# Patient Record
Sex: FEMALE | Race: WHITE | NOT HISPANIC OR LATINO | Employment: FULL TIME | URBAN - METROPOLITAN AREA
[De-identification: names, ages, dates, MRNs, and addresses within clinical notes are randomized per-mention and may not be internally consistent; named-entity substitution may affect disease eponyms.]

---

## 2018-08-02 ENCOUNTER — OFFICE VISIT (OUTPATIENT)
Dept: INTERNAL MEDICINE | Facility: CLINIC | Age: 33
End: 2018-08-02
Payer: COMMERCIAL

## 2018-08-02 VITALS
DIASTOLIC BLOOD PRESSURE: 78 MMHG | WEIGHT: 218.06 LBS | TEMPERATURE: 98 F | SYSTOLIC BLOOD PRESSURE: 130 MMHG | RESPIRATION RATE: 18 BRPM | HEART RATE: 90 BPM | HEIGHT: 65 IN | BODY MASS INDEX: 36.33 KG/M2 | OXYGEN SATURATION: 100 %

## 2018-08-02 DIAGNOSIS — G93.2 PSEUDOTUMOR CEREBRI: Primary | ICD-10-CM

## 2018-08-02 PROCEDURE — 3008F BODY MASS INDEX DOCD: CPT | Mod: CPTII,S$GLB,, | Performed by: FAMILY MEDICINE

## 2018-08-02 PROCEDURE — 99204 OFFICE O/P NEW MOD 45 MIN: CPT | Mod: S$GLB,,, | Performed by: FAMILY MEDICINE

## 2018-08-02 PROCEDURE — 99999 PR PBB SHADOW E&M-EST. PATIENT-LVL IV: CPT | Mod: PBBFAC,,, | Performed by: FAMILY MEDICINE

## 2018-08-02 RX ORDER — ACETAZOLAMIDE 500 MG/1
CAPSULE, EXTENDED RELEASE ORAL
COMMUNITY
Start: 2018-07-16 | End: 2018-09-19 | Stop reason: SINTOL

## 2018-08-02 NOTE — ASSESSMENT & PLAN NOTE
Discussed the importance of a low-sodium diet trying for weight loss.  She already has had a gastric banding procedure.  Referring to Neurology and Ophthalmology for additional recommendations.  In the meantime, I recommended that she continue to take the acetazolamide.

## 2018-08-02 NOTE — PROGRESS NOTES
Subjective:       Patient ID: Shruti Mercado is a 33 y.o. female.    Chief Complaint: Establish Care    HPI  Shruti is here today to establish care and to discuss a chronic problem that she has been having for the last 5 years.  She started having migraines around 2013.  At that time had a normal MRI.  Was seen by Neurology and has had MRI and MRA at [facility] along with lumbar puncture around this time last year.  The working diagnosis was pseudotumor cerebral high and she was put on Diamox which she has been on since last June.  Taking diamox 500mg since last year. Makes hands and feet to tingle but it does help to keep the pressure down.  When she talks taking it she will have an increased sensation of pressure.  She was found to have some papilledema of the right eye last year which is when she was referred to the neurologist.  She has not seen a neurologist in about a year and currently is looking to get in to another neurology consultation because she was not satisfied with her previous experience.  She is just looking for what else she can do to help prevent her headaches and not have to take the medication because it is causing side effects.  The headaches are frequent and limit her activities.  She has not had any new change in her vision.  Whenever she has severe headaches will will make it better is going to a cool room , laying down and putting a cold towel over her eyes.    Activity makes her head pound. Also sun sensitive.      Family History   Problem Relation Age of Onset    Hypertension Mother     Hypertension Father     Diabetes Father     Cancer Sister     No Known Problems Daughter     No Known Problems Son     No Known Problems Sister     No Known Problems Sister     No Known Problems Sister     Cancer Paternal Grandfather     Stroke Paternal Grandfather        Current Outpatient Prescriptions:     acetaZOLAMIDE (DIAMOX) 500 mg CpSR, , Disp: , Rfl:     Review of Systems  "  Constitutional: Negative for chills and fever.   HENT: Negative for congestion and sore throat.    Eyes: Negative for visual disturbance.   Respiratory: Negative for cough and shortness of breath.    Cardiovascular: Negative for chest pain.   Gastrointestinal: Negative for abdominal pain, constipation and diarrhea.   Endocrine: Negative for polydipsia and polyuria.   Genitourinary: Negative for difficulty urinating and menstrual problem.   Skin: Negative for rash.   Neurological: Positive for headaches. Negative for dizziness, speech difficulty, weakness and light-headedness.   Hematological: Does not bruise/bleed easily.       Objective:   /78 (BP Location: Left arm, Patient Position: Sitting, BP Method: Medium (Automatic))   Pulse 90   Temp 97.8 °F (36.6 °C) (Tympanic)   Resp 18   Ht 5' 4.5" (1.638 m)   Wt 98.9 kg (218 lb 0.6 oz)   LMP 07/15/2018 (Within Weeks) Comment: Mirena  SpO2 100%   BMI 36.85 kg/m²      Physical Exam   Constitutional: She is oriented to person, place, and time. She appears well-developed and well-nourished. No distress.   HENT:   Head: Normocephalic and atraumatic.   Nose: Nose normal.   Eyes: Conjunctivae and EOM are normal. Pupils are equal, round, and reactive to light. Right eye exhibits no discharge. Left eye exhibits no discharge.   Neck: No thyromegaly present.   Cardiovascular: Normal rate and regular rhythm.    No murmur heard.  Pulmonary/Chest: Effort normal and breath sounds normal. No respiratory distress.   Abdominal: Soft. She exhibits no distension.   Musculoskeletal: She exhibits no edema.   Neurological: She is alert and oriented to person, place, and time. She displays normal reflexes. No cranial nerve deficit. Coordination normal.   Skin: No rash noted. She is not diaphoretic.   Psychiatric: She has a normal mood and affect. Her behavior is normal.       Assessment & Plan     Problem List Items Addressed This Visit        Neuro    Pseudotumor cerebri - " Primary    Current Assessment & Plan     Discussed the importance of a low-sodium diet trying for weight loss.  She already has had a gastric banding procedure.  Referring to Neurology and Ophthalmology for additional recommendations.  In the meantime, I recommended that she continue to take the acetazolamide.         Relevant Orders    Ambulatory Referral to Neurology    Ambulatory Referral to Ophthalmology            No Follow-up on file.

## 2018-08-09 ENCOUNTER — OFFICE VISIT (OUTPATIENT)
Dept: OPHTHALMOLOGY | Facility: CLINIC | Age: 33
End: 2018-08-09
Payer: COMMERCIAL

## 2018-08-09 DIAGNOSIS — G93.2 PSEUDOTUMOR CEREBRI: Primary | ICD-10-CM

## 2018-08-09 DIAGNOSIS — H52.13 MYOPIA, BILATERAL: ICD-10-CM

## 2018-08-09 DIAGNOSIS — H52.223 REGULAR ASTIGMATISM OF BOTH EYES: ICD-10-CM

## 2018-08-09 PROCEDURE — 92004 COMPRE OPH EXAM NEW PT 1/>: CPT | Mod: S$GLB,,, | Performed by: OPTOMETRIST

## 2018-08-09 PROCEDURE — 92310 CONTACT LENS FITTING OU: CPT | Mod: S$GLB,,, | Performed by: OPTOMETRIST

## 2018-08-09 PROCEDURE — 92133 CPTRZD OPH DX IMG PST SGM ON: CPT | Mod: S$GLB,ICN,, | Performed by: OPTOMETRIST

## 2018-08-09 PROCEDURE — 99999 PR PBB SHADOW E&M-EST. PATIENT-LVL I: CPT | Mod: PBBFAC,,, | Performed by: OPTOMETRIST

## 2018-08-09 PROCEDURE — 92015 DETERMINE REFRACTIVE STATE: CPT | Mod: S$GLB,,, | Performed by: OPTOMETRIST

## 2018-08-09 NOTE — LETTER
August 10, 2018      Nilesh Espinosa DO  [address] [address] [address]           [facility]  [address] [address]  Phone: [phone]  Fax: [phone]          Patient: Shruti Mercado   MR Number: [id number]   YOB: 1985   Date of Visit: 8/9/2018       Dear Dr. Nilesh Espinosa:    Thank you for referring Shruti Mercado to me for evaluation. Attached you will find relevant portions of my assessment and plan of care.    If you have questions, please do not hesitate to call me. I look forward to following Shruti Mercado along with you.    Sincerely,    Andres Holt, OD    Enclosure  CC:  No Recipients    If you would like to receive this communication electronically, please contact [email or web address] or [phone] to request more information on [facility] [employer] [facility] Link access.    For providers and/or their staff who would like to refer a patient to [facility], please contact us through our one-stop-shop provider referral line, [facility], at [phone].    If you feel you have received this communication in error or would no longer like to receive these types of communications, please e-mail [email or web address]

## 2018-08-10 NOTE — PROGRESS NOTES
HPI     PTs last exam was 6 months ago. PT c/o blurred vision and wears cls most   of the time.   H/o Pseudotumor cerebri x 1 year. Has had spinal tap 1 year ago  Diamox 500 mg daily, does not like to take. Feels it makes headaches worse  HPI    Any vision changes since last exam: decreased distance va  Eye pain: 0  Other ocular symptoms: migraines once a week    Do you wear currently wear glasses or contacts? both    Interested in contacts today? Yes, wears air optix    Do you plan on getting new glasses today? If needed      Last edited by Andres Holt, OD on 8/9/2018  3:55 PM. (History)            Assessment /Plan     For exam results, see Encounter Report.    Pseudotumor cerebri  -     Posterior Segment OCT Optic Nerve- Both eyes  -     Vincent Visual Field - OU - Extended - Both Eyes; Future  va stable OD, OS today  Baseline dOCT today  Trace-1+ ONH elevation nasal aspect of nerve only  No hemes, margin visible 360  Has appt with neuro next month to establish care at [facility]  rtc for 24-2VF EPrx    Myopia, bilateral  Regular astigmatism of both eyes  Eyeglass Final Rx     Eyeglass Final Rx       Sphere Cylinder Axis    Right -1.75 +0.50 020    Left -2.50 +0.50 155    Expiration Date:  8/10/2019              Contact Lens Prescription (8/9/2018)        Brand Sphere    Right Air Optix Aqua -1.50    Left Air Optix Aqua -2.25    Expiration Date:  8/10/2019    Replacement:  Monthly    Wearing Schedule:  Daily wear        Dispensed trial contact lenses today. Patient is to wear lenses for 1 week. Ok to order supply if no problems. RTC PRN if any problems arise.      RTC for 24-2VF and review, next available

## 2018-08-24 ENCOUNTER — OFFICE VISIT (OUTPATIENT)
Dept: OPHTHALMOLOGY | Facility: CLINIC | Age: 33
End: 2018-08-24
Payer: COMMERCIAL

## 2018-08-24 DIAGNOSIS — G93.2 PSEUDOTUMOR CEREBRI: Primary | ICD-10-CM

## 2018-08-24 PROCEDURE — 92083 EXTENDED VISUAL FIELD XM: CPT | Mod: S$GLB,,, | Performed by: OPTOMETRIST

## 2018-08-24 PROCEDURE — 99499 UNLISTED E&M SERVICE: CPT | Mod: S$GLB,,, | Performed by: OPTOMETRIST

## 2018-08-24 PROCEDURE — 99999 PR PBB SHADOW E&M-EST. PATIENT-LVL I: CPT | Mod: PBBFAC,,, | Performed by: OPTOMETRIST

## 2018-08-24 NOTE — PROGRESS NOTES
HPI     Follow-up      Additional comments: REV HVF              Comments     PT was last seen on 8/9/18 with DNL for full exam and pseudotumor   cerebri. PT was told to rtc for 24-2VF.   Has not seen neurology, goes next month.   Diamox every other day per pt          Last edited by Luz Sandhu MA on 8/24/2018  2:48 PM. (History)            Assessment /Plan     For exam results, see Encounter Report.    Pseudotumor cerebri  -     Vincent Visual Field - OU - Extended - Both Eyes      No visual field defects OD, OS  Keep scheduled appt with Neurology  F/u 3 months    RTC 3 months for repeat dOCT or PRN  Discussed above and all questions were answered.

## 2018-09-19 ENCOUNTER — OFFICE VISIT (OUTPATIENT)
Dept: NEUROLOGY | Facility: CLINIC | Age: 33
End: 2018-09-19
Payer: COMMERCIAL

## 2018-09-19 DIAGNOSIS — G93.2 PSEUDOTUMOR CEREBRI: Primary | ICD-10-CM

## 2018-09-19 DIAGNOSIS — Z98.51 S/P TUBAL LIGATION: ICD-10-CM

## 2018-09-19 DIAGNOSIS — G43.009 MIGRAINE WITHOUT AURA AND WITHOUT STATUS MIGRAINOSUS, NOT INTRACTABLE: ICD-10-CM

## 2018-09-19 PROCEDURE — 99243 OFF/OP CNSLTJ NEW/EST LOW 30: CPT | Mod: S$GLB,,, | Performed by: PSYCHIATRY & NEUROLOGY

## 2018-09-19 PROCEDURE — 99999 PR PBB SHADOW E&M-EST. PATIENT-LVL II: CPT | Mod: PBBFAC,,, | Performed by: PSYCHIATRY & NEUROLOGY

## 2018-09-19 RX ORDER — TOPIRAMATE 50 MG/1
50 CAPSULE, EXTENDED RELEASE ORAL DAILY
Qty: 30 CAPSULE | Refills: 11 | Status: SHIPPED | OUTPATIENT
Start: 2018-09-19 | End: 2019-07-02

## 2018-09-19 NOTE — LETTER
September 19, 2018    To Whom it may concern:          [facility]  Neurology  [address]  [place] [address]  Phone: [phone]   September 19, 2018     Patient: Shruti Mercado   YOB: 1985   Date of Visit: 9/19/2018       To Whom it May Concern:    Shruti Mercado was seen in my clinic on 9/19/2018. She If you have any questions or concerns, please don't hesitate to call.    Sincerely,         Sangita Ma LPN

## 2018-09-19 NOTE — PATIENT INSTRUCTIONS
Topiramate tablets  What is this medicine?  TOPIRAMATE (toe PYRE a mate) is used to treat seizures in adults or children with epilepsy. It is also used for the prevention of migraine headaches.  How should I use this medicine?  Take this medicine by mouth with a glass of water. Follow the directions on the prescription label. Do not crush or chew. You may take this medicine with meals. Take your medicine at regular intervals. Do not take it more often than directed.  Talk to your pediatrician regarding the use of this medicine in children. Special care may be needed. While this drug may be prescribed for children as young as 2 years of age for selected conditions, precautions do apply.  What side effects may I notice from receiving this medicine?  Side effects that you should report to your doctor or health care professional as soon as possible:  · allergic reactions like skin rash, itching or hives, swelling of the face, lips, or tongue  · decreased sweating and/or rise in body temperature  · depression  · difficulty breathing, fast or irregular breathing patterns  · difficulty speaking  · difficulty walking or controlling muscle movements  · hearing impairment  · redness, blistering, peeling or loosening of the skin, including inside the mouth  · tingling, pain or numbness in the hands or feet  · unusual bleeding or bruising  · unusually weak or tired  · worsening of mood, thoughts or actions of suicide or dying  Side effects that usually do not require medical attention (report to your doctor or health care professional if they continue or are bothersome):  · altered taste  · back pain, joint or muscle aches and pains  · diarrhea, or constipation  · headache  · loss of appetite  · nausea  · stomach upset, indigestion  · tremors  What may interact with this medicine?  Do not take this medicine with any of the following medications:  · probenecid  This medicine may also interact with the following  medications:  · acetazolamide  · alcohol  · amitriptyline  · aspirin and aspirin-like medicines  · birth control pills  · certain medicines for depression  · certain medicines for seizures  · certain medicines that treat or prevent blood clots like warfarin, enoxaparin, dalteparin, apixaban, dabigatran, and rivaroxaban  · digoxin  · hydrochlorothiazide  · lithium  · medicines for pain, sleep, or muscle relaxation  · metformin  · methazolamide  · NSAIDS, medicines for pain and inflammation, like ibuprofen or naproxen  · pioglitazone  · risperidone  What if I miss a dose?  If you miss a dose, take it as soon as you can. If your next dose is to be taken in less than 6 hours, then do not take the missed dose. Take the next dose at your regular time. Do not take double or extra doses.  Where should I keep my medicine?  Keep out of the reach of children.  Store at room temperature between 15 and 30 degrees C (59 and 86 degrees F) in a tightly closed container. Protect from moisture. Throw away any unused medicine after the expiration date.  What should I tell my health care provider before I take this medicine?  They need to know if you have any of these conditions:  · bleeding disorders  · cirrhosis of the liver or liver disease  · diarrhea  · glaucoma  · kidney stones or kidney disease  · low blood counts, like low white cell, platelet, or red cell counts  · lung disease like asthma, obstructive pulmonary disease, emphysema  · metabolic acidosis  · on a ketogenic diet  · schedule for surgery or a procedure  · suicidal thoughts, plans, or attempt; a previous suicide attempt by you or a family member  · an unusual or allergic reaction to topiramate, other medicines, foods, dyes, or preservatives  · pregnant or trying to get pregnant  · breast-feeding  What should I watch for while using this medicine?  Visit your doctor or health care professional for regular checks on your progress. Do not stop taking this medicine  suddenly. This increases the risk of seizures if you are using this medicine to control epilepsy. Wear a medical identification bracelet or chain to say you have epilepsy or seizures, and carry a card that lists all your medicines.  This medicine can decrease sweating and increase your body temperature. Watch for signs of  sweating or fever, especially in children. Avoid extreme heat, hot baths, and saunas. Be careful about exercising, especially in hot weather. Contact your health care provider right away if you notice a fever or decrease in sweating.  You should drink plenty of fluids while taking this medicine. If you have had kidney stones in the past, this will help to reduce your chances of forming kidney stones.  If you have stomach pain, with nausea or vomiting and yellowing of your eyes or skin, call your doctor immediately.  You may get drowsy, dizzy, or have blurred vision. Do not drive, use machinery, or do anything that needs mental alertness until you know how this medicine affects you. To reduce dizziness, do not sit or stand up quickly, especially if you are an older patient. Alcohol can increase drowsiness and dizziness. Avoid alcoholic drinks.  If you notice blurred vision, eye pain, or other eye problems, seek medical attention at once for an eye exam.  The use of this medicine may increase the chance of suicidal thoughts or actions. Pay special attention to how you are responding while on this medicine. Any worsening of mood, or thoughts of suicide or dying should be reported to your health care professional right away.  This medicine may increase the chance of developing metabolic acidosis. If left untreated, this can cause kidney stones, bone disease, or slowed growth in children. Symptoms include breathing fast, fatigue, loss of appetite, irregular heartbeat, or loss of consciousness. Call your doctor immediately if you experience any of these side effects. Also, tell your doctor about  any surgery you plan on having while taking this medicine since this may increase your risk for metabolic acidosis.  Birth control pills may not work properly while you are taking this medicine. Talk to your doctor about using an extra method of birth control.  Women who become pregnant while using this medicine may enroll in the [place] Antiepileptic Drug Pregnancy Registry by calling 1-366.277.3463. This registry collects information about the safety of antiepileptic drug use during pregnancy.  NOTE:This sheet is a summary. It may not cover all possible information. If you have questions about this medicine, talk to your doctor, pharmacist, or health care provider. Copyright© 2017 [employer]

## 2018-09-19 NOTE — LETTER
September 19, 2018      Nilesh Espinosa DO  [address] [address] [address]           [facility]  [address] [facility] [address]  Phone: [phone]          Patient: Shruti Mercado   MR Number: [id number]   YOB: 1985   Date of Visit: 9/19/2018       Dear Dr. Nilesh Espinosa:    Thank you for referring Shruti Mercado to me for evaluation. Attached you will find relevant portions of my assessment and plan of care.    If you have questions, please do not hesitate to call me. I look forward to following Shruti Mercado along with you.    Sincerely,    Shahla Harry MD    Enclosure  CC:  No Recipients    If you would like to receive this communication electronically, please contact [email or web address] or [phone] to request more information on [facility] Link access.    For providers and/or their staff who would like to refer a patient to [facility], please contact us through our one-stop-shop provider referral line, [facility], at [phone].    If you feel you have received this communication in error or would no longer like to receive these types of communications, please e-mail [email or web address]

## 2018-09-19 NOTE — PROGRESS NOTES
Subjective:       Patient ID: Shruti Mercado is a 33 y.o. female.    Chief Complaint: pseudotum Cerebri; Headache; and Blurred Vision    HPI     The patient was referred by Dr. Espinosa for evaluation.    The patient has been having holocephalic throbbing 8-10/10 headaches lasting for 24 hours and associated with nausea and light sensitivity, visual obscurations and tinnitus for 2-3 years. In 2017 she was found to have B/L papilledema with unremarkable Brain MRI/MRA and OP of 20 cm H2O. She is on Diamox 500 mg BID but she takes it QOD because she does not like the SEs of numbness though it helps with her symptoms.        Review of Systems   Constitutional: Negative for appetite change and fatigue.   HENT: Positive for tinnitus. Negative for hearing loss.    Eyes: Positive for visual disturbance. Negative for photophobia.   Respiratory: Negative for apnea and shortness of breath.    Cardiovascular: Negative for chest pain and palpitations.   Gastrointestinal: Negative for nausea and vomiting.   Endocrine: Negative for cold intolerance and heat intolerance.   Genitourinary: Negative for difficulty urinating and urgency.   Musculoskeletal: Negative for arthralgias, back pain, gait problem, joint swelling, myalgias, neck pain and neck stiffness.   Skin: Negative for color change and rash.   Allergic/Immunologic: Negative for environmental allergies and immunocompromised state.   Neurological: Positive for numbness and headaches. Negative for dizziness, tremors, seizures, syncope, facial asymmetry, speech difficulty, weakness and light-headedness.   Hematological: Negative for adenopathy. Does not bruise/bleed easily.   Psychiatric/Behavioral: Negative for agitation, behavioral problems, confusion, decreased concentration, dysphoric mood, hallucinations, self-injury, sleep disturbance and suicidal ideas. The patient is not hyperactive.          Current Outpatient Medications:     topiramate (TROKENDI XR) 50 mg Cp24, Take  50 mg by mouth once daily., Disp: 30 capsule, Rfl: 11  Past Medical History:   Diagnosis Date    Morbid obesity      Past Surgical History:   Procedure Laterality Date    ABSCESS DRAINAGE      ESOPHAGOGASTRODUODENOSCOPY (EGD) N/A 4/17/2015    Performed by William Fu III, [facility] at [facility] ENDO    GASTRECTOMY-SLEEVE-LAPAROSCOPIC N/A 5/27/2015    Performed by Louis O. Jeansonne IV, MD at [facility] OR    LAPAROSCOPIC TUBAL LIGATION W/ ANTOINETTE ROSS  2008    OVARIAN CYST SURGERY      sleeve      TUBAL LIGATION       Social History     Socioeconomic History    Marital status:      Spouse name: Not on file    Number of children: Not on file    Years of education: Not on file    Highest education level: Not on file   Social Needs    Financial resource strain: Not on file    Food insecurity - worry: Not on file    Food insecurity - inability: Not on file    Transportation needs - medical: Not on file    Transportation needs - non-medical: Not on file   Occupational History    Not on file   Tobacco Use    Smoking status: Never Smoker    Smokeless tobacco: Never Used   Substance and Sexual Activity    Alcohol use: No    Drug use: No    Sexual activity: Yes   Other Topics Concern    Not on file   Social History Narrative    , 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        Objective:     GENERAL APPEARANCE:     The patient looks comfortable.    No signs of medical or psychiatric distress.    Normal breathing pattern.    No dysmorphic features    Normal eye contact.     GENERAL MEDICAL EXAM:    HEENT:  Head is atraumatic normocephalic. No tender temporal arteries.     Neck and Axillae: No JVD. No carotid bruits. No thyromegaly. No lymphadenopathy.    Cardiopulmonary: No cyanosis. No tachypnea. Normal respiratory effort.  Clear breath sounds. Normal heart sounds with regular rhythm and no murmurs.    Gastrointestinal:  No stomas or lesions. No hernias.  Abdomen is soft non-tender. No masses or  organomegaly.    Skin, Hair and Nails: No pathognonomic skin rash. No neurofibromatosis.   No stigmata of autoimmune disease.     Limbs: No varicose veins. No edema. Symmetric pulses.     Muskoskeletal: No deformities.No spine tenderness.   No signs of longstanding neuropathy. No dislocations or fractures.        Neurologic Exam     Mental Status   Oriented to person, place, and time.   Registration: recalls 3 of 3 objects. Recall at 5 minutes: recalls 3 of 3 objects. Follows 3 step commands.   Attention: normal. Concentration: normal.   Speech: speech is normal   Level of consciousness: alert  Knowledge: good and consistent with education. Able to perform simple calculations.   Able to name object. Able to read. Able to repeat. Able to write. Normal comprehension.     Cranial Nerves     CN II   Visual fields full to confrontation.   Visual acuity: normal  Right visual field deficit: none  Left visual field deficit: none     CN III, IV, VI   Pupils are equal, round, and reactive to light.  Extraocular motions are normal.   Right pupil: Size: 2 mm. Shape: regular. Reactivity: brisk. Consensual response: intact. Accommodation: intact.   Left pupil: Size: 2 mm. Shape: regular. Reactivity: brisk. Consensual response: intact. Accommodation: intact.   CN III: no CN III palsy  CN VI: no CN VI palsy  Nystagmus: none   Diplopia: none  Ophthalmoparesis: none  Upgaze: normal  Downgaze: normal  Conjugate gaze: present  Vestibulo-ocular reflex: present    CN V   Facial sensation intact.   Right facial sensation deficit: none  Left facial sensation deficit: none  Right corneal reflex: normal  Left corneal reflex: normal    CN VII   Right facial weakness: none  Left facial weakness: none  Right taste: normal  Left taste: normal    CN VIII   CN VIII normal.   Hearing: intact  Right Rinne: AC > BC  Left Rinne: AC > BC  Thompson: does not lateralize     CN IX, X   CN IX normal.   CN X normal.   Palate: symmetric  Right gag reflex:  normal  Left gag reflex: normal    CN XI   CN XI normal.   Right sternocleidomastoid strength: normal  Left sternocleidomastoid strength: normal  Right trapezius strength: normal  Left trapezius strength: normal    CN XII   CN XII normal.   Tongue: not atrophic  Fasciculations: absent  Tongue deviation: none  Mild B/L Papilledema      Motor Exam   Muscle bulk: normal  Overall muscle tone: normal  Right arm tone: normal  Left arm tone: normal  Right arm pronator drift: absent  Left arm pronator drift: absent  Right leg tone: normal  Left leg tone: normal    Strength   Strength 5/5 throughout.   Right neck flexion: 5/5  Left neck flexion: 5/5  Right neck extension: 5/5  Left neck extension: 5/5  Right deltoid: 5/5  Left deltoid: 5/5  Right biceps: 5/5  Left biceps: 5/5  Right triceps: 5/5  Left triceps: 5/5  Right wrist flexion: 5/5  Left wrist flexion: 5/5  Right wrist extension: 5/5  Left wrist extension: 5/5  Right interossei: 5/5  Left interossei: 5/5  Right abdominals: 5/5  Left abdominals: 5/5  Right iliopsoas: 5/5  Left iliopsoas: 5/5  Right quadriceps: 5/5  Left quadriceps: 5/5  Right hamstrin/5  Left hamstrin/5  Right glutei: 5/5  Left glutei: 5/5  Right anterior tibial: 5/5  Left anterior tibial: 5/5  Right posterior tibial: 5/5  Left posterior tibial: 5/5  Right peroneal: 5/5  Left peroneal: 5/5  Right gastroc: 5/5  Left gastroc: 5/5    Sensory Exam   Light touch normal.   Right arm light touch: normal  Left arm light touch: normal  Right leg light touch: normal  Left leg light touch: normal  Vibration normal.   Right arm vibration: normal  Left arm vibration: normal  Right leg vibration: normal  Left leg vibration: normal  Proprioception normal.   Right arm proprioception: normal  Left arm proprioception: normal  Right leg proprioception: normal  Left leg proprioception: normal  Pinprick normal.   Right arm pinprick: normal  Left arm pinprick: normal  Right leg pinprick: normal  Left leg pinprick:  normal  Graphesthesia: normal  Stereognosis: normal    Gait, Coordination, and Reflexes     Gait  Gait: normal    Coordination   Romberg: negative  Finger to nose coordination: normal  Heel to shin coordination: normal  Tandem walking coordination: normal    Tremor   Resting tremor: absent  Intention tremor: absent  Action tremor: absent    Reflexes   Right brachioradialis: 2+  Left brachioradialis: 2+  Right biceps: 2+  Left biceps: 2+  Right triceps: 2+  Left triceps: 2+  Right patellar: 2+  Left patellar: 2+  Right achilles: 2+  Left achilles: 2+  Right : 2+  Left : 2+  Right plantar: normal  Left plantar: normal  Right Kumari: absent  Left Kumari: absent  Right ankle clonus: absent  Left ankle clonus: absent  Right pendular knee jerk: absent  Left pendular knee jerk: absent      Lab Results   Component Value Date    WBC 7.46 10/22/2015    HGB 12.2 10/22/2015    HCT 37.1 10/22/2015    MCV 90 10/22/2015     10/22/2015     Sodium   Date Value Ref Range Status   10/22/2015 143 136 - 145 mmol/L Final     Potassium   Date Value Ref Range Status   10/22/2015 3.9 3.5 - 5.1 mmol/L Final     Chloride   Date Value Ref Range Status   10/22/2015 104 95 - 110 mmol/L Final     CO2   Date Value Ref Range Status   10/22/2015 27 23 - 29 mmol/L Final     Glucose   Date Value Ref Range Status   10/22/2015 77 70 - 110 mg/dL Final     BUN, Bld   Date Value Ref Range Status   10/22/2015 15 6 - 20 mg/dL Final     Creatinine   Date Value Ref Range Status   10/22/2015 0.9 0.5 - 1.4 mg/dL Final     Calcium   Date Value Ref Range Status   10/22/2015 9.9 8.7 - 10.5 mg/dL Final     Total Protein   Date Value Ref Range Status   10/22/2015 7.4 6.0 - 8.4 g/dL Final     Albumin   Date Value Ref Range Status   10/22/2015 4.2 3.5 - 5.2 g/dL Final     Total Bilirubin   Date Value Ref Range Status   10/22/2015 0.3 0.1 - 1.0 mg/dL Final     Comment:     For infants and newborns, interpretation of results should be based  on  gestational age, weight and in agreement with clinical  observations.  Premature Infant recommended reference ranges:  Up to 24 hours.............<8.0 mg/dL  Up to 48 hours............<12.0 mg/dL  3-5 days..................<15.0 mg/dL  6-29 days.................<15.0 mg/dL       Alkaline Phosphatase   Date Value Ref Range Status   10/22/2015 61 55 - 135 U/L Final     AST   Date Value Ref Range Status   10/22/2015 17 10 - 40 U/L Final     ALT   Date Value Ref Range Status   10/22/2015 16 10 - 44 U/L Final     Anion Gap   Date Value Ref Range Status   10/22/2015 12 8 - 16 mmol/L Final     eGFR if    Date Value Ref Range Status   10/22/2015 >60.0 >60 mL/min/1.73 m^2 Final     eGFR if non    Date Value Ref Range Status   10/22/2015 >60.0 >60 mL/min/1.73 m^2 Final     Comment:     Calculation used to obtain the estimated glomerular filtration  rate (eGFR) is the CKD-EPI equation. Since race is unknown   in our information system, the eGFR values for   -American and Non--American patients are given   for each creatinine result.       Lab Results   Component Value Date    CBQNLONL35 696 10/22/2015     Lab Results   Component Value Date    TSH 2.441 08/07/2015    FREET4 0.78 08/07/2015 2017    Brain MRI MRA NL    CSF OP 20 cmH2O    Assessment:       1. Pseudotumor cerebri    2. Migraine without aura and without status migrainosus, not intractable        Plan:       Try changing Diamox to TPM XR (Trokenddi XR) with slow titration to  mg.    She is S/P BTL.       RTC in 4 weeks

## 2018-09-21 ENCOUNTER — TELEPHONE (OUTPATIENT)
Dept: NEUROLOGY | Facility: CLINIC | Age: 33
End: 2018-09-21

## 2018-09-21 DIAGNOSIS — G93.2 PSEUDOTUMOR CEREBRI: ICD-10-CM

## 2018-09-21 DIAGNOSIS — G43.009 MIGRAINE WITHOUT AURA AND WITHOUT STATUS MIGRAINOSUS, NOT INTRACTABLE: ICD-10-CM

## 2018-09-21 NOTE — TELEPHONE ENCOUNTER
Called patient with information that [employer] is to lets us know about prior approval for topamax in 24-72 hours patient aware and gave the web site to the patient for o co- pay thru the drug company patient voiced understanding and was appreciative.

## 2018-09-21 NOTE — TELEPHONE ENCOUNTER
----- Message from Kathy Wagner sent at 9/21/2018 10:59 AM CDT -----  Contact: pt  States she would like to speak to the nurse regarding the prescription Dr Harry wrote for her. She has some questions on it. Please call pt at [phone]. Thank you

## 2018-09-26 ENCOUNTER — TELEPHONE (OUTPATIENT)
Dept: NEUROLOGY | Facility: CLINIC | Age: 33
End: 2018-09-26

## 2018-09-26 NOTE — TELEPHONE ENCOUNTER
----- Message from Naida Chaudhary sent at 9/26/2018 10:54 AM CDT -----  Contact: pt  The pt states she is returning a missed call, the pt can be reached at [phone]///thxMW

## 2018-09-27 ENCOUNTER — TELEPHONE (OUTPATIENT)
Dept: NEUROLOGY | Facility: CLINIC | Age: 33
End: 2018-09-27

## 2018-09-27 NOTE — TELEPHONE ENCOUNTER
----- Message from Bogdan Yusuf sent at 9/27/2018  9:51 AM CDT -----  Contact: pt  She is stating that her medication is not being approved, please advise [phone] (home)

## 2018-09-27 NOTE — TELEPHONE ENCOUNTER
Patient wanted to speak with the rep about the medication Trokendi XR o copay not going thru they want 103.00 dollars

## 2018-10-01 ENCOUNTER — PATIENT OUTREACH (OUTPATIENT)
Dept: ADMINISTRATIVE | Facility: HOSPITAL | Age: 33
End: 2018-10-01

## 2018-11-13 ENCOUNTER — TELEPHONE (OUTPATIENT)
Dept: NEUROLOGY | Facility: CLINIC | Age: 33
End: 2018-11-13

## 2018-11-13 NOTE — TELEPHONE ENCOUNTER
----- Message from Shahla Harry MD sent at 11/13/2018  8:52 AM CST -----  Contact: pt  OK  ----- Message -----  From: Sangita Ma LPN  Sent: 11/13/2018   8:40 AM  To: Shahla Harry MD    Has had Trokendi XR 50 ng since last visit and it is now causing nausea was on Acetazolamide 500 mg prior to last visit and wants to restart the other medication please asvise  ----- Message -----  From: Maribeth Nails  Sent: 11/13/2018   8:19 AM  To: Piero Gale Staff    She's calling to discuss medications, please advise [phone] (home)

## 2019-04-05 ENCOUNTER — TELEPHONE (OUTPATIENT)
Dept: INTERNAL MEDICINE | Facility: CLINIC | Age: 34
End: 2019-04-05

## 2019-04-05 NOTE — TELEPHONE ENCOUNTER
----- Message from Isaak Morales sent at 4/5/2019 11:48 AM CDT -----  ..Type:  RX Refill Request    Who Called:  pt  Refill or New Rx:refill   RX Name and Strength:acetazolamide  How is the patient currently taking it? (ex. 1XDay):  Is this a 30 day or 90 day RX: 30  Preferred Pharmacy with phone number:.  [facility] [address] [place] [address] [address]  [id number] [address]  [facility] [address]  Phone: [phone] Fax: [phone]    Local or Mail Order:local   Ordering Provider:weeks   Would the patient rather a call back or a response via [facility]? Call back   Best Call Back Number:[phone]  Additional Information:

## 2019-04-08 ENCOUNTER — TELEPHONE (OUTPATIENT)
Dept: INTERNAL MEDICINE | Facility: CLINIC | Age: 34
End: 2019-04-08

## 2019-04-08 RX ORDER — ACETAZOLAMIDE 500 MG/1
500 CAPSULE, EXTENDED RELEASE ORAL DAILY
Qty: 30 CAPSULE | Refills: 0 | OUTPATIENT
Start: 2019-04-08

## 2019-04-08 NOTE — TELEPHONE ENCOUNTER
----- Message from Jarrett Wesley sent at 4/8/2019  2:21 PM CDT -----  Contact: pt   Acetazolamide 500 mg         .Type:  RX Refill Request    Who Called:  Pt   Refill or New Rx:refill   RX Name and Strength: Acetazolamide 500 mg   How is the patient currently taking it? (ex. 1XDay):1 x day   Is this a 30 day or 90 day RX: 30 day   Preferred Pharmacy with phone number: [facility]   Local or Mail Order: local   Ordering Provider: weeks   Would the patient rather a call back or a response via [facility]?  Callback   Best Call Back Number: ..[phone] (home)    Additional Information:          ..  [facility] [address] [place] [address] [address]  [address] [facility] [address]  Phone: [phone] Fax: [phone]

## 2019-04-08 NOTE — TELEPHONE ENCOUNTER
----- Message from Jarrett Wesley sent at 4/8/2019  2:21 PM CDT -----  Contact: pt   Acetazolamide 500 mg         .Type:  RX Refill Request    Who Called:  Pt   Refill or New Rx:refill   RX Name and Strength: Acetazolamide 500 mg   How is the patient currently taking it? (ex. 1XDay):1 x day   Is this a 30 day or 90 day RX: 30 day   Preferred Pharmacy with phone number: [facility]   Local or Mail Order: local   Ordering Provider: weeks   Would the patient rather a call back or a response via [facility]?  Callback   Best Call Back Number: ..[phone] (home)    Additional Information:          ..  [facility] [address] [place] [id number] [address]  [address] [facility] [address]  Phone: [phone] Fax: [phone]

## 2019-04-08 NOTE — TELEPHONE ENCOUNTER
----- Message from Jarrett Wesley sent at 4/8/2019  2:21 PM CDT -----  Contact: pt   Acetazolamide 500 mg         .Type:  RX Refill Request    Who Called:  Pt   Refill or New Rx:refill   RX Name and Strength: Acetazolamide 500 mg   How is the patient currently taking it? (ex. 1XDay):1 x day   Is this a 30 day or 90 day RX: 30 day   Preferred Pharmacy with phone number: [facility]   Local or Mail Order: local   Ordering Provider: weeks   Would the patient rather a call back or a response via [facility]?  Callback   Best Call Back Number: ..[phone] (home)    Additional Information:          ..  [facility] [address] [place] [id number] [address]  [address] [facility] [address]  Phone: [phone] Fax: [phone]

## 2019-04-08 NOTE — TELEPHONE ENCOUNTER
Patient was call informed will need to schedule an appointment. Last visit 08/18. Patient states will call to schedule an appointment

## 2019-07-02 ENCOUNTER — OFFICE VISIT (OUTPATIENT)
Dept: INTERNAL MEDICINE | Facility: CLINIC | Age: 34
End: 2019-07-02
Payer: COMMERCIAL

## 2019-07-02 VITALS
OXYGEN SATURATION: 97 % | RESPIRATION RATE: 16 BRPM | HEART RATE: 70 BPM | HEIGHT: 65 IN | DIASTOLIC BLOOD PRESSURE: 84 MMHG | TEMPERATURE: 97 F | SYSTOLIC BLOOD PRESSURE: 136 MMHG | BODY MASS INDEX: 36.4 KG/M2 | WEIGHT: 218.5 LBS

## 2019-07-02 DIAGNOSIS — E66.9 OBESITY (BMI 35.0-39.9 WITHOUT COMORBIDITY): ICD-10-CM

## 2019-07-02 DIAGNOSIS — G93.2 PSEUDOTUMOR CEREBRI: ICD-10-CM

## 2019-07-02 DIAGNOSIS — R53.82 CHRONIC FATIGUE: Primary | ICD-10-CM

## 2019-07-02 LAB
BILIRUB SERPL-MCNC: NORMAL MG/DL
BLOOD URINE, POC: NORMAL
COLOR, POC UA: YELLOW
GLUCOSE UR QL STRIP: NORMAL
KETONES UR QL STRIP: NORMAL
LEUKOCYTE ESTERASE URINE, POC: NORMAL
NITRITE, POC UA: NORMAL
PH, POC UA: 5
PROTEIN, POC: NORMAL
SPECIFIC GRAVITY, POC UA: 1.02
UROBILINOGEN, POC UA: NORMAL

## 2019-07-02 PROCEDURE — 81002 POCT URINE DIPSTICK WITHOUT MICROSCOPE: ICD-10-PCS | Mod: S$GLB,,, | Performed by: FAMILY MEDICINE

## 2019-07-02 PROCEDURE — 3008F PR BODY MASS INDEX (BMI) DOCUMENTED: ICD-10-PCS | Mod: CPTII,S$GLB,, | Performed by: FAMILY MEDICINE

## 2019-07-02 PROCEDURE — 99214 PR OFFICE/OUTPT VISIT, EST, LEVL IV, 30-39 MIN: ICD-10-PCS | Mod: 25,S$GLB,, | Performed by: FAMILY MEDICINE

## 2019-07-02 PROCEDURE — 99999 PR PBB SHADOW E&M-EST. PATIENT-LVL III: ICD-10-PCS | Mod: PBBFAC,,, | Performed by: FAMILY MEDICINE

## 2019-07-02 PROCEDURE — 81002 URINALYSIS NONAUTO W/O SCOPE: CPT | Mod: S$GLB,,, | Performed by: FAMILY MEDICINE

## 2019-07-02 PROCEDURE — 99214 OFFICE O/P EST MOD 30 MIN: CPT | Mod: 25,S$GLB,, | Performed by: FAMILY MEDICINE

## 2019-07-02 PROCEDURE — 3008F BODY MASS INDEX DOCD: CPT | Mod: CPTII,S$GLB,, | Performed by: FAMILY MEDICINE

## 2019-07-02 PROCEDURE — 99999 PR PBB SHADOW E&M-EST. PATIENT-LVL III: CPT | Mod: PBBFAC,,, | Performed by: FAMILY MEDICINE

## 2019-07-02 RX ORDER — ACETAZOLAMIDE 500 MG/1
500 CAPSULE, EXTENDED RELEASE ORAL 2 TIMES DAILY
COMMUNITY
End: 2019-07-02 | Stop reason: SDUPTHER

## 2019-07-02 RX ORDER — ACETAZOLAMIDE 500 MG/1
500 CAPSULE, EXTENDED RELEASE ORAL 2 TIMES DAILY
Qty: 60 CAPSULE | Refills: 2 | Status: SHIPPED | OUTPATIENT
Start: 2019-07-02 | End: 2020-05-29 | Stop reason: SDUPTHER

## 2019-07-02 RX ORDER — IBUPROFEN 600 MG/1
TABLET ORAL
Refills: 0 | COMMUNITY
Start: 2019-03-28 | End: 2019-07-02

## 2019-07-02 RX ORDER — MELOXICAM 15 MG/1
TABLET ORAL
Qty: 30 TABLET | Refills: 0 | Status: SHIPPED | OUTPATIENT
Start: 2019-07-02 | End: 2020-09-08

## 2019-07-02 RX ORDER — MELOXICAM 15 MG/1
TABLET ORAL
Refills: 0 | COMMUNITY
Start: 2019-06-14 | End: 2019-07-02 | Stop reason: SDUPTHER

## 2019-07-02 NOTE — ASSESSMENT & PLAN NOTE
Unclear what the cause of this is.  She does not feel depressed.  She does have a history of hypothyroidism and took thyroid medication in the past but discontinued on her own.  Has not had levels and awhile.  Getting routine blood work today.  Exam essentially normal.

## 2019-07-02 NOTE — ASSESSMENT & PLAN NOTE
Refilled the acetazolamide which she has been taking intermittently to help with her symptoms although she is having some tingling side effects with this medication.  I advised her to set follow-up with Neurology for discussion of medication adjustment.

## 2019-07-02 NOTE — PROGRESS NOTES
Subjective:       Patient ID: Shruti Mercado is a 34 y.o. female.    Chief Complaint: Fatigue    HPI    Here today with a new complaint of fatigue that has been bothering her for the last couple of months.  She has not started on any new medication.  No new regimen with her life.  In fact, she has been exercising 4 days a week for the past couple of months after joining the [facility].  She says that she does not like going to exercise because it has changed her routine and she would rather just go home and be with her children and cook and clean.  She denies any depression.  Has not had similar symptoms like this in the past but did remind me that she has a history of hypothyroidism but took herself off medication previously.     in regards to the pseudotumor 0 cerebri,  She takes the acetazolamide intermittently but does not take it every day because she says it gives her symptoms of tingling throughout her body.  However, she feels this is better than the migraines that developed whenever she does not take M medicine    Family History   Problem Relation Age of Onset    Hypertension Mother     Hypertension Father     Diabetes Father     Cancer Sister     No Known Problems Daughter     No Known Problems Son     No Known Problems Sister     No Known Problems Sister     No Known Problems Sister     Cancer Paternal Grandfather     Stroke Paternal Grandfather        Current Outpatient Medications:     acetaZOLAMIDE (DIAMOX) 500 mg CpSR, Take 1 capsule (500 mg total) by mouth 2 (two) times daily., Disp: 60 capsule, Rfl: 2    meloxicam (MOBIC) 15 MG tablet, TAKE 1 TABLET BY MOUTH EVERY DAY as needed, Disp: 30 tablet, Rfl: 0    Review of Systems   Constitutional: Positive for fatigue. Negative for chills and fever.   Respiratory: Negative for cough and shortness of breath.    Cardiovascular: Negative for chest pain.   Gastrointestinal: Negative for abdominal pain.   Genitourinary: Negative for dysuria and  "hematuria.   Skin: Negative for rash.   Neurological: Negative for dizziness.       Objective:   /84 (BP Location: Left arm, Patient Position: Sitting, BP Method: Large (Automatic))   Pulse 70   Temp 96.6 °F (35.9 °C) (Tympanic)   Resp 16   Ht 5' 4.5" (1.638 m)   Wt 99.1 kg (218 lb 7.6 oz)   SpO2 97%   BMI 36.92 kg/m²      Physical Exam   Constitutional: She is oriented to person, place, and time. She appears well-developed and well-nourished. No distress.   HENT:   Head: Normocephalic and atraumatic.   Nose: Nose normal.   Eyes: Pupils are equal, round, and reactive to light. Conjunctivae and EOM are normal. Right eye exhibits no discharge. Left eye exhibits no discharge.   Neck: No thyromegaly present.   Cardiovascular: Normal rate and regular rhythm.   No murmur heard.  Pulmonary/Chest: Effort normal and breath sounds normal. No respiratory distress.   Abdominal: Soft. She exhibits no distension.   Musculoskeletal: She exhibits no edema.   Neurological: She is alert and oriented to person, place, and time.   Skin: No rash noted. She is not diaphoretic.   Psychiatric: She has a normal mood and affect. Her behavior is normal.       Assessment & Plan     Problem List Items Addressed This Visit        Neuro    Pseudotumor cerebri    Current Assessment & Plan       Refilled the acetazolamide which she has been taking intermittently to help with her symptoms although she is having some tingling side effects with this medication.  I advised her to set follow-up with Neurology for discussion of medication adjustment.            Endocrine    Obesity (BMI 35.0-39.9 without comorbidity)    Current Assessment & Plan     Encouraged her to keep up the good work with going exercise.            Other    Chronic fatigue - Primary    Current Assessment & Plan       Unclear what the cause of this is.  She does not feel depressed.  She does have a history of hypothyroidism and took thyroid medication in the past but " discontinued on her own.  Has not had levels and awhile.  Getting routine blood work today.  Exam essentially normal.         Relevant Orders    Comprehensive metabolic panel    Lipid panel    CBC auto differential    TSH    Hemoglobin A1c    POCT URINE DIPSTICK WITHOUT MICROSCOPE            No follow-ups on file.    Disclaimer:  This note may have been prepared using voice recognition software, it may have not been extensively proofed, as such there could be errors within the text such as sound alike errors.

## 2019-07-05 ENCOUNTER — LAB VISIT (OUTPATIENT)
Dept: LAB | Facility: HOSPITAL | Age: 34
End: 2019-07-05
Attending: FAMILY MEDICINE
Payer: COMMERCIAL

## 2019-07-05 DIAGNOSIS — R53.82 CHRONIC FATIGUE: ICD-10-CM

## 2019-07-05 LAB
ALBUMIN SERPL BCP-MCNC: 4 G/DL (ref 3.5–5.2)
ALP SERPL-CCNC: 50 U/L (ref 55–135)
ALT SERPL W/O P-5'-P-CCNC: 11 U/L (ref 10–44)
ANION GAP SERPL CALC-SCNC: 10 MMOL/L (ref 8–16)
AST SERPL-CCNC: 14 U/L (ref 10–40)
BASOPHILS # BLD AUTO: 0.01 K/UL (ref 0–0.2)
BASOPHILS NFR BLD: 0.1 % (ref 0–1.9)
BILIRUB SERPL-MCNC: 0.3 MG/DL (ref 0.1–1)
BUN SERPL-MCNC: 15 MG/DL (ref 6–20)
CALCIUM SERPL-MCNC: 9.5 MG/DL (ref 8.7–10.5)
CHLORIDE SERPL-SCNC: 104 MMOL/L (ref 95–110)
CO2 SERPL-SCNC: 26 MMOL/L (ref 23–29)
CREAT SERPL-MCNC: 0.8 MG/DL (ref 0.5–1.4)
DIFFERENTIAL METHOD: ABNORMAL
EOSINOPHIL # BLD AUTO: 0.1 K/UL (ref 0–0.5)
EOSINOPHIL NFR BLD: 0.9 % (ref 0–8)
ERYTHROCYTE [DISTWIDTH] IN BLOOD BY AUTOMATED COUNT: 12.4 % (ref 11.5–14.5)
EST. GFR  (AFRICAN AMERICAN): >60 ML/MIN/1.73 M^2
EST. GFR  (NON AFRICAN AMERICAN): >60 ML/MIN/1.73 M^2
GLUCOSE SERPL-MCNC: 87 MG/DL (ref 70–110)
HCT VFR BLD AUTO: 37.7 % (ref 37–48.5)
HGB BLD-MCNC: 12.2 G/DL (ref 12–16)
LYMPHOCYTES # BLD AUTO: 1.7 K/UL (ref 1–4.8)
LYMPHOCYTES NFR BLD: 16.8 % (ref 18–48)
MCH RBC QN AUTO: 30.2 PG (ref 27–31)
MCHC RBC AUTO-ENTMCNC: 32.4 G/DL (ref 32–36)
MCV RBC AUTO: 93 FL (ref 82–98)
MONOCYTES # BLD AUTO: 0.9 K/UL (ref 0.3–1)
MONOCYTES NFR BLD: 8.8 % (ref 4–15)
NEUTROPHILS # BLD AUTO: 7.3 K/UL (ref 1.8–7.7)
NEUTROPHILS NFR BLD: 73.4 % (ref 38–73)
PLATELET # BLD AUTO: 257 K/UL (ref 150–350)
PMV BLD AUTO: 10.5 FL (ref 9.2–12.9)
POTASSIUM SERPL-SCNC: 4.3 MMOL/L (ref 3.5–5.1)
PROT SERPL-MCNC: 7.2 G/DL (ref 6–8.4)
RBC # BLD AUTO: 4.04 M/UL (ref 4–5.4)
SODIUM SERPL-SCNC: 140 MMOL/L (ref 136–145)
TSH SERPL DL<=0.005 MIU/L-ACNC: 2.48 UIU/ML (ref 0.4–4)
WBC # BLD AUTO: 9.97 K/UL (ref 3.9–12.7)

## 2019-07-05 PROCEDURE — 80053 COMPREHEN METABOLIC PANEL: CPT | Mod: PO

## 2019-07-05 PROCEDURE — 85025 COMPLETE CBC W/AUTO DIFF WBC: CPT | Mod: PO

## 2019-07-05 PROCEDURE — 80061 LIPID PANEL: CPT

## 2019-07-05 PROCEDURE — 36415 COLL VENOUS BLD VENIPUNCTURE: CPT | Mod: PO

## 2019-07-05 PROCEDURE — 83036 HEMOGLOBIN GLYCOSYLATED A1C: CPT

## 2019-07-05 PROCEDURE — 84443 ASSAY THYROID STIM HORMONE: CPT | Mod: PO

## 2019-07-06 LAB
CHOLEST SERPL-MCNC: 198 MG/DL (ref 120–199)
CHOLEST/HDLC SERPL: 3.4 {RATIO} (ref 2–5)
ESTIMATED AVG GLUCOSE: 97 MG/DL (ref 68–131)
HBA1C MFR BLD HPLC: 5 % (ref 4–5.6)
HDLC SERPL-MCNC: 59 MG/DL (ref 40–75)
HDLC SERPL: 29.8 % (ref 20–50)
LDLC SERPL CALC-MCNC: 108.2 MG/DL (ref 63–159)
NONHDLC SERPL-MCNC: 139 MG/DL
TRIGL SERPL-MCNC: 154 MG/DL (ref 30–150)

## 2019-07-13 ENCOUNTER — PATIENT MESSAGE (OUTPATIENT)
Dept: INTERNAL MEDICINE | Facility: CLINIC | Age: 34
End: 2019-07-13

## 2020-05-28 ENCOUNTER — PATIENT OUTREACH (OUTPATIENT)
Dept: ADMINISTRATIVE | Facility: OTHER | Age: 35
End: 2020-05-28

## 2020-05-29 ENCOUNTER — OFFICE VISIT (OUTPATIENT)
Dept: OPHTHALMOLOGY | Facility: CLINIC | Age: 35
End: 2020-05-29
Payer: COMMERCIAL

## 2020-05-29 DIAGNOSIS — H52.13 MYOPIA OF BOTH EYES WITH ASTIGMATISM: ICD-10-CM

## 2020-05-29 DIAGNOSIS — H52.203 MYOPIA OF BOTH EYES WITH ASTIGMATISM: ICD-10-CM

## 2020-05-29 DIAGNOSIS — G93.2 PSEUDOTUMOR CEREBRI: Primary | ICD-10-CM

## 2020-05-29 PROCEDURE — 92015 PR REFRACTION: ICD-10-PCS | Mod: S$GLB,,, | Performed by: OPTOMETRIST

## 2020-05-29 PROCEDURE — 92014 COMPRE OPH EXAM EST PT 1/>: CPT | Mod: S$GLB,,, | Performed by: OPTOMETRIST

## 2020-05-29 PROCEDURE — 99999 PR PBB SHADOW E&M-EST. PATIENT-LVL II: CPT | Mod: PBBFAC,,, | Performed by: OPTOMETRIST

## 2020-05-29 PROCEDURE — 92015 DETERMINE REFRACTIVE STATE: CPT | Mod: S$GLB,,, | Performed by: OPTOMETRIST

## 2020-05-29 PROCEDURE — 92014 PR EYE EXAM, EST PATIENT,COMPREHESV: ICD-10-PCS | Mod: S$GLB,,, | Performed by: OPTOMETRIST

## 2020-05-29 PROCEDURE — 99999 PR PBB SHADOW E&M-EST. PATIENT-LVL II: ICD-10-PCS | Mod: PBBFAC,,, | Performed by: OPTOMETRIST

## 2020-05-29 RX ORDER — ACETAZOLAMIDE 500 MG/1
500 CAPSULE, EXTENDED RELEASE ORAL 2 TIMES DAILY
Qty: 60 CAPSULE | Refills: 2 | Status: SHIPPED | OUTPATIENT
Start: 2020-05-29 | End: 2021-01-15 | Stop reason: SINTOL

## 2020-05-29 NOTE — PROGRESS NOTES
Chart reviewed.   Immunizations: LINKS failed   Orders placed: n/a  Upcoming appts to satisfy NIYAH topics: n/a

## 2020-09-02 ENCOUNTER — TELEPHONE (OUTPATIENT)
Dept: INTERNAL MEDICINE | Facility: CLINIC | Age: 35
End: 2020-09-02

## 2020-09-02 NOTE — TELEPHONE ENCOUNTER
----- Message from Yuly Morales sent at 9/2/2020 11:43 AM CDT -----  Regarding: pt  Pt would like a call from nurse in regards to questions about a testing . Please call back at .[phone] (rgdy)         Thank you,   Yuly Morales

## 2020-09-08 ENCOUNTER — LAB VISIT (OUTPATIENT)
Dept: LAB | Facility: HOSPITAL | Age: 35
End: 2020-09-08
Attending: FAMILY MEDICINE
Payer: COMMERCIAL

## 2020-09-08 ENCOUNTER — OFFICE VISIT (OUTPATIENT)
Dept: INTERNAL MEDICINE | Facility: CLINIC | Age: 35
End: 2020-09-08
Payer: COMMERCIAL

## 2020-09-08 ENCOUNTER — TELEPHONE (OUTPATIENT)
Dept: NEUROLOGY | Facility: CLINIC | Age: 35
End: 2020-09-08

## 2020-09-08 VITALS
RESPIRATION RATE: 18 BRPM | BODY MASS INDEX: 37.34 KG/M2 | TEMPERATURE: 100 F | SYSTOLIC BLOOD PRESSURE: 134 MMHG | OXYGEN SATURATION: 98 % | HEIGHT: 65 IN | DIASTOLIC BLOOD PRESSURE: 88 MMHG | WEIGHT: 224.13 LBS | HEART RATE: 84 BPM

## 2020-09-08 DIAGNOSIS — Z00.00 ROUTINE HEALTH MAINTENANCE: ICD-10-CM

## 2020-09-08 DIAGNOSIS — Z28.21 INFLUENZA VACCINE REFUSED: ICD-10-CM

## 2020-09-08 DIAGNOSIS — Z00.00 ROUTINE HEALTH MAINTENANCE: Primary | ICD-10-CM

## 2020-09-08 DIAGNOSIS — Z11.4 ENCOUNTER FOR SCREENING FOR HIV: ICD-10-CM

## 2020-09-08 DIAGNOSIS — G43.009 MIGRAINE WITHOUT AURA AND WITHOUT STATUS MIGRAINOSUS, NOT INTRACTABLE: ICD-10-CM

## 2020-09-08 LAB
ALBUMIN SERPL BCP-MCNC: 4.2 G/DL (ref 3.5–5.2)
ALP SERPL-CCNC: 55 U/L (ref 55–135)
ALT SERPL W/O P-5'-P-CCNC: 13 U/L (ref 10–44)
ANION GAP SERPL CALC-SCNC: 8 MMOL/L (ref 8–16)
AST SERPL-CCNC: 12 U/L (ref 10–40)
BASOPHILS # BLD AUTO: 0.03 K/UL (ref 0–0.2)
BASOPHILS NFR BLD: 0.4 % (ref 0–1.9)
BILIRUB SERPL-MCNC: 0.3 MG/DL (ref 0.1–1)
BUN SERPL-MCNC: 13 MG/DL (ref 6–20)
CALCIUM SERPL-MCNC: 9.3 MG/DL (ref 8.7–10.5)
CHLORIDE SERPL-SCNC: 108 MMOL/L (ref 95–110)
CHOLEST SERPL-MCNC: 206 MG/DL (ref 120–199)
CHOLEST/HDLC SERPL: 4 {RATIO} (ref 2–5)
CO2 SERPL-SCNC: 26 MMOL/L (ref 23–29)
CREAT SERPL-MCNC: 0.8 MG/DL (ref 0.5–1.4)
DIFFERENTIAL METHOD: ABNORMAL
EOSINOPHIL # BLD AUTO: 0.2 K/UL (ref 0–0.5)
EOSINOPHIL NFR BLD: 2.1 % (ref 0–8)
ERYTHROCYTE [DISTWIDTH] IN BLOOD BY AUTOMATED COUNT: 12.3 % (ref 11.5–14.5)
EST. GFR  (AFRICAN AMERICAN): >60 ML/MIN/1.73 M^2
EST. GFR  (NON AFRICAN AMERICAN): >60 ML/MIN/1.73 M^2
GLUCOSE SERPL-MCNC: 88 MG/DL (ref 70–110)
HCT VFR BLD AUTO: 38.6 % (ref 37–48.5)
HDLC SERPL-MCNC: 51 MG/DL (ref 40–75)
HDLC SERPL: 24.8 % (ref 20–50)
HGB BLD-MCNC: 12.2 G/DL (ref 12–16)
IMM GRANULOCYTES # BLD AUTO: 0.03 K/UL (ref 0–0.04)
IMM GRANULOCYTES NFR BLD AUTO: 0.4 % (ref 0–0.5)
LDLC SERPL CALC-MCNC: 128.2 MG/DL (ref 63–159)
LYMPHOCYTES # BLD AUTO: 1.8 K/UL (ref 1–4.8)
LYMPHOCYTES NFR BLD: 20.9 % (ref 18–48)
MCH RBC QN AUTO: 30.2 PG (ref 27–31)
MCHC RBC AUTO-ENTMCNC: 31.6 G/DL (ref 32–36)
MCV RBC AUTO: 96 FL (ref 82–98)
MONOCYTES # BLD AUTO: 0.8 K/UL (ref 0.3–1)
MONOCYTES NFR BLD: 9.8 % (ref 4–15)
NEUTROPHILS # BLD AUTO: 5.7 K/UL (ref 1.8–7.7)
NEUTROPHILS NFR BLD: 66.4 % (ref 38–73)
NONHDLC SERPL-MCNC: 155 MG/DL
NRBC BLD-RTO: 0 /100 WBC
PLATELET # BLD AUTO: 246 K/UL (ref 150–350)
PMV BLD AUTO: 11.1 FL (ref 9.2–12.9)
POTASSIUM SERPL-SCNC: 4 MMOL/L (ref 3.5–5.1)
PROT SERPL-MCNC: 7.3 G/DL (ref 6–8.4)
RBC # BLD AUTO: 4.04 M/UL (ref 4–5.4)
SODIUM SERPL-SCNC: 142 MMOL/L (ref 136–145)
TRIGL SERPL-MCNC: 134 MG/DL (ref 30–150)
TSH SERPL DL<=0.005 MIU/L-ACNC: 2.52 UIU/ML (ref 0.4–4)
WBC # BLD AUTO: 8.51 K/UL (ref 3.9–12.7)

## 2020-09-08 PROCEDURE — 99395 PR PREVENTIVE VISIT,EST,18-39: ICD-10-PCS | Mod: 25,S$GLB,, | Performed by: FAMILY MEDICINE

## 2020-09-08 PROCEDURE — 86803 HEPATITIS C AB TEST: CPT

## 2020-09-08 PROCEDURE — 99999 PR PBB SHADOW E&M-EST. PATIENT-LVL IV: CPT | Mod: PBBFAC,,, | Performed by: FAMILY MEDICINE

## 2020-09-08 PROCEDURE — 85025 COMPLETE CBC W/AUTO DIFF WBC: CPT

## 2020-09-08 PROCEDURE — 99999 PR PBB SHADOW E&M-EST. PATIENT-LVL IV: ICD-10-PCS | Mod: PBBFAC,,, | Performed by: FAMILY MEDICINE

## 2020-09-08 PROCEDURE — 36415 COLL VENOUS BLD VENIPUNCTURE: CPT | Mod: PO

## 2020-09-08 PROCEDURE — 86703 HIV-1/HIV-2 1 RESULT ANTBDY: CPT

## 2020-09-08 PROCEDURE — 80053 COMPREHEN METABOLIC PANEL: CPT

## 2020-09-08 PROCEDURE — 84443 ASSAY THYROID STIM HORMONE: CPT

## 2020-09-08 PROCEDURE — 80061 LIPID PANEL: CPT

## 2020-09-08 PROCEDURE — 99395 PREV VISIT EST AGE 18-39: CPT | Mod: 25,S$GLB,, | Performed by: FAMILY MEDICINE

## 2020-09-08 RX ORDER — RIZATRIPTAN BENZOATE 10 MG/1
10 TABLET ORAL DAILY
COMMUNITY
Start: 2020-06-05 | End: 2022-10-06 | Stop reason: ALTCHOICE

## 2020-09-08 RX ORDER — TOPIRAMATE 25 MG/1
25 TABLET ORAL 2 TIMES DAILY
Qty: 60 TABLET | Refills: 2 | Status: SHIPPED | OUTPATIENT
Start: 2020-09-08 | End: 2021-01-15 | Stop reason: SDUPTHER

## 2020-09-08 NOTE — TELEPHONE ENCOUNTER
----- Message from Nilesh Espinosa DO sent at 9/8/2020  2:17 PM CDT -----  Regarding: worsening GOMEZ  Hey patient has been having worsening and more frequent HA recently. She self increased the diamox to twice a week and the maxalt not helping.   I added low dose topamax today. Can y'all get her in relatively soon or touch base with her.    Thanks!  Dr. Espinosa

## 2020-09-08 NOTE — PROGRESS NOTES
Subjective:       Patient ID: Shruti Mercado is a 35 y.o. female.    Chief Complaint: Migraine    Came in for routine annual exam and is due for labs. Declines flu shot.     35F here for F/U of headaches (Dx'ed with PTC and Migraines, S/P therapeutic LP last 11/2019 on Diamox daily). Says her migraine-like headaches have been worse :now associated with vomiting 1-2x/episode, about once a week for last 1-2wk, each HA lasting 48-72hr, with phono/photophobia, not improved by her rizatriptan or alleve/ibuprofen, says she had to take a cold shower and sit in a dark room. Denies fever/malaise/night sweats, jaw claudication. Was concerned some of her diet may be a trigger for her migraines.      Family History   Problem Relation Age of Onset    Hypertension Mother     Hypertension Father     Diabetes Father     Cancer Sister     No Known Problems Daughter     No Known Problems Son     No Known Problems Sister     No Known Problems Sister     No Known Problems Sister     Cancer Paternal Grandfather     Stroke Paternal Grandfather        Current Outpatient Medications:     acetaZOLAMIDE (DIAMOX) 500 mg CpSR, Take 1 capsule (500 mg total) by mouth 2 (two) times daily., Disp: 60 capsule, Rfl: 2    rizatriptan (MAXALT) 10 MG tablet, Take 10 mg by mouth Daily., Disp: , Rfl:     topiramate (TOPAMAX) 25 MG tablet, Take 1 tablet (25 mg total) by mouth 2 (two) times daily., Disp: 60 tablet, Rfl: 2    Review of Systems   Constitutional: Positive for fatigue. Negative for activity change, appetite change, chills, fever and unexpected weight change.   HENT: Negative for postnasal drip, rhinorrhea and sinus pain.    Eyes: Positive for visual disturbance (Chronic, attributed to PTC). Negative for pain and redness.   Respiratory: Negative for cough, choking and shortness of breath.    Cardiovascular: Negative for chest pain and palpitations.   Gastrointestinal: Positive for nausea and vomiting. Negative for abdominal pain.  "  Endocrine: Negative for polydipsia, polyphagia and polyuria.   Genitourinary: Negative for difficulty urinating and flank pain.   Musculoskeletal: Positive for arthralgias. Negative for joint swelling and myalgias.   Skin: Negative for color change and pallor.   Neurological: Positive for headaches. Negative for tremors, syncope, facial asymmetry and speech difficulty.   Psychiatric/Behavioral: Negative for agitation and behavioral problems.       Objective:   /88   Pulse 84   Temp 99.5 °F (37.5 °C) (Temporal)   Resp 18   Ht 5' 4.5" (1.638 m)   Wt 101.6 kg (224 lb 1.6 oz)   SpO2 98%   BMI 37.87 kg/m²      Physical Exam  Constitutional:       General: She is not in acute distress.     Appearance: Normal appearance. She is obese. She is not ill-appearing, toxic-appearing or diaphoretic.      Comments: Obese young female, NAD   HENT:      Head: Normocephalic and atraumatic.      Nose: Nose normal. No congestion.   Eyes:      Extraocular Movements: Extraocular movements intact.      Pupils: Pupils are equal, round, and reactive to light.   Neck:      Musculoskeletal: Normal range of motion and neck supple.   Cardiovascular:      Rate and Rhythm: Normal rate and regular rhythm.      Pulses: Normal pulses.      Heart sounds: Murmur (2/6 systolic murmur, known per patient) present. No gallop.    Pulmonary:      Effort: Pulmonary effort is normal. No respiratory distress.      Breath sounds: Normal breath sounds. No wheezing.   Musculoskeletal: Normal range of motion.         General: No deformity.   Skin:     General: Skin is warm and dry.      Capillary Refill: Capillary refill takes less than 2 seconds.   Neurological:      General: No focal deficit present.      Mental Status: She is alert and oriented to person, place, and time.      Cranial Nerves: No cranial nerve deficit.   Psychiatric:         Mood and Affect: Mood normal.         Behavior: Behavior normal.         Assessment & Plan     Problem List " Items Addressed This Visit        Neuro    Migraine without aura and without status migrainosus, not intractable      Other Visit Diagnoses     Routine health maintenance    -  Primary    Relevant Orders    Comprehensive metabolic panel    CBC auto differential    Lipid Panel    HIV 1/2 Ag/Ab (4th Gen)    TSH    Hepatitis C Antibody    Influenza vaccine refused        Encounter for screening for HIV          35F F/U, worsening of migraine-type HA superimposed on PTC    1. Migraine: Discussed prophylactic medications (propanolol, verapamil, AEDs), cautious use of NSAIDS given history of gastric ulcers found during her sleeve gastrectomy 2017. Got her an appointment with neurology this Thursday. Low dose of topamax initiated    No follow-ups on file.    Disclaimer:  This note may have been prepared using voice recognition software, it may have not been extensively proofed, as such there could be errors within the text such as sound alike errors.

## 2020-09-09 LAB
HCV AB SERPL QL IA: NEGATIVE
HIV 1+2 AB+HIV1 P24 AG SERPL QL IA: NEGATIVE

## 2021-01-05 ENCOUNTER — TELEPHONE (OUTPATIENT)
Dept: INTERNAL MEDICINE | Facility: CLINIC | Age: 36
End: 2021-01-05

## 2021-01-13 ENCOUNTER — PATIENT OUTREACH (OUTPATIENT)
Dept: ADMINISTRATIVE | Facility: OTHER | Age: 36
End: 2021-01-13

## 2021-01-15 ENCOUNTER — OFFICE VISIT (OUTPATIENT)
Dept: NEUROLOGY | Facility: CLINIC | Age: 36
End: 2021-01-15
Payer: COMMERCIAL

## 2021-01-15 VITALS
WEIGHT: 219.13 LBS | SYSTOLIC BLOOD PRESSURE: 118 MMHG | RESPIRATION RATE: 16 BRPM | BODY MASS INDEX: 36.51 KG/M2 | DIASTOLIC BLOOD PRESSURE: 72 MMHG | HEIGHT: 65 IN | HEART RATE: 72 BPM

## 2021-01-15 DIAGNOSIS — J02.0 PHARYNGITIS, STREPTOCOCCAL, ACUTE: ICD-10-CM

## 2021-01-15 DIAGNOSIS — E66.9 OBESITY (BMI 35.0-39.9 WITHOUT COMORBIDITY): ICD-10-CM

## 2021-01-15 DIAGNOSIS — G08 DURAL VENOUS SINUS THROMBOSIS: ICD-10-CM

## 2021-01-15 DIAGNOSIS — R53.82 CHRONIC FATIGUE: ICD-10-CM

## 2021-01-15 DIAGNOSIS — Z98.51 S/P TUBAL LIGATION: ICD-10-CM

## 2021-01-15 DIAGNOSIS — G43.009 MIGRAINE WITHOUT AURA AND WITHOUT STATUS MIGRAINOSUS, NOT INTRACTABLE: ICD-10-CM

## 2021-01-15 DIAGNOSIS — H47.10 PAPILLEDEMA, RIGHT EYE: ICD-10-CM

## 2021-01-15 DIAGNOSIS — G93.2 PSEUDOTUMOR CEREBRI: Primary | ICD-10-CM

## 2021-01-15 DIAGNOSIS — Z98.84 S/P GASTRIC BYPASS: ICD-10-CM

## 2021-01-15 DIAGNOSIS — K21.9 GASTROESOPHAGEAL REFLUX DISEASE, UNSPECIFIED WHETHER ESOPHAGITIS PRESENT: ICD-10-CM

## 2021-01-15 DIAGNOSIS — R01.1 HEART MURMUR: ICD-10-CM

## 2021-01-15 PROCEDURE — 99215 PR OFFICE/OUTPT VISIT, EST, LEVL V, 40-54 MIN: ICD-10-PCS | Mod: S$GLB,,, | Performed by: PSYCHIATRY & NEUROLOGY

## 2021-01-15 PROCEDURE — 99215 OFFICE O/P EST HI 40 MIN: CPT | Mod: S$GLB,,, | Performed by: PSYCHIATRY & NEUROLOGY

## 2021-01-15 PROCEDURE — 3008F BODY MASS INDEX DOCD: CPT | Mod: CPTII,S$GLB,, | Performed by: PSYCHIATRY & NEUROLOGY

## 2021-01-15 PROCEDURE — 99999 PR PBB SHADOW E&M-EST. PATIENT-LVL III: CPT | Mod: PBBFAC,,, | Performed by: PSYCHIATRY & NEUROLOGY

## 2021-01-15 PROCEDURE — 99999 PR PBB SHADOW E&M-EST. PATIENT-LVL III: ICD-10-PCS | Mod: PBBFAC,,, | Performed by: PSYCHIATRY & NEUROLOGY

## 2021-01-15 PROCEDURE — 3008F PR BODY MASS INDEX (BMI) DOCUMENTED: ICD-10-PCS | Mod: CPTII,S$GLB,, | Performed by: PSYCHIATRY & NEUROLOGY

## 2021-01-15 RX ORDER — TOPIRAMATE 25 MG/1
25 TABLET ORAL NIGHTLY
Qty: 90 TABLET | Refills: 3 | Status: SHIPPED | OUTPATIENT
Start: 2021-01-15 | End: 2022-10-06 | Stop reason: ALTCHOICE

## 2021-01-15 RX ORDER — ACETAZOLAMIDE 125 MG/1
125 TABLET ORAL 3 TIMES DAILY
Qty: 270 TABLET | Refills: 3 | Status: SHIPPED | OUTPATIENT
Start: 2021-01-15 | End: 2022-10-06 | Stop reason: ALTCHOICE

## 2021-02-02 ENCOUNTER — HOSPITAL ENCOUNTER (OUTPATIENT)
Dept: RADIOLOGY | Facility: HOSPITAL | Age: 36
Discharge: HOME | End: 2021-02-02
Attending: PSYCHIATRY & NEUROLOGY
Payer: COMMERCIAL

## 2021-02-02 DIAGNOSIS — G93.2 PSEUDOTUMOR CEREBRI: ICD-10-CM

## 2021-02-02 DIAGNOSIS — G08 DURAL VENOUS SINUS THROMBOSIS: ICD-10-CM

## 2021-02-02 PROCEDURE — 70553 MRI BRAIN STEM W/O & W/DYE: CPT | Mod: TC

## 2021-02-02 PROCEDURE — 70544 MR ANGIOGRAPHY HEAD W/O DYE: CPT | Mod: TC,59

## 2021-02-02 PROCEDURE — 70545 MR ANGIOGRAPHY HEAD W/DYE: CPT | Mod: TC

## 2021-02-02 PROCEDURE — A9585 GADOBUTROL INJECTION: HCPCS | Performed by: PSYCHIATRY & NEUROLOGY

## 2021-02-02 PROCEDURE — 25500020 PHARM REV CODE 255: Performed by: PSYCHIATRY & NEUROLOGY

## 2021-02-02 RX ORDER — GADOBUTROL 604.72 MG/ML
10 INJECTION INTRAVENOUS
Status: COMPLETED | OUTPATIENT
Start: 2021-02-02 | End: 2021-02-02

## 2021-02-02 RX ADMIN — GADOBUTROL 9 ML: 604.72 INJECTION INTRAVENOUS at 11:02

## 2021-02-03 ENCOUNTER — PATIENT MESSAGE (OUTPATIENT)
Dept: NEUROLOGY | Facility: CLINIC | Age: 36
End: 2021-02-03

## 2021-02-04 ENCOUNTER — PATIENT MESSAGE (OUTPATIENT)
Dept: NEUROLOGY | Facility: CLINIC | Age: 36
End: 2021-02-04

## 2021-04-29 ENCOUNTER — PATIENT MESSAGE (OUTPATIENT)
Dept: RESEARCH | Facility: HOSPITAL | Age: 36
End: 2021-04-29

## 2021-08-06 ENCOUNTER — OFFICE VISIT (OUTPATIENT)
Dept: OPHTHALMOLOGY | Facility: CLINIC | Age: 36
End: 2021-08-06
Payer: COMMERCIAL

## 2021-08-06 DIAGNOSIS — G93.2 PSEUDOTUMOR CEREBRI: Primary | ICD-10-CM

## 2021-08-06 DIAGNOSIS — H52.13 MYOPIA OF BOTH EYES WITH ASTIGMATISM: ICD-10-CM

## 2021-08-06 DIAGNOSIS — H52.203 MYOPIA OF BOTH EYES WITH ASTIGMATISM: ICD-10-CM

## 2021-08-06 PROCEDURE — 92014 COMPRE OPH EXAM EST PT 1/>: CPT | Mod: S$GLB,,, | Performed by: OPTOMETRIST

## 2021-08-06 PROCEDURE — 99999 PR PBB SHADOW E&M-EST. PATIENT-LVL II: ICD-10-PCS | Mod: PBBFAC,,, | Performed by: OPTOMETRIST

## 2021-08-06 PROCEDURE — 92133 POSTERIOR SEGMENT OCT OPTIC NERVE(OCULAR COHERENCE TOMOGRAPHY) - OU - BOTH EYES: ICD-10-PCS | Mod: S$GLB,,, | Performed by: OPTOMETRIST

## 2021-08-06 PROCEDURE — 92133 CPTRZD OPH DX IMG PST SGM ON: CPT | Mod: S$GLB,,, | Performed by: OPTOMETRIST

## 2021-08-06 PROCEDURE — 1159F PR MEDICATION LIST DOCUMENTED IN MEDICAL RECORD: ICD-10-PCS | Mod: CPTII,S$GLB,, | Performed by: OPTOMETRIST

## 2021-08-06 PROCEDURE — 92014 PR EYE EXAM, EST PATIENT,COMPREHESV: ICD-10-PCS | Mod: S$GLB,,, | Performed by: OPTOMETRIST

## 2021-08-06 PROCEDURE — 99999 PR PBB SHADOW E&M-EST. PATIENT-LVL II: CPT | Mod: PBBFAC,,, | Performed by: OPTOMETRIST

## 2021-08-06 PROCEDURE — 1159F MED LIST DOCD IN RCRD: CPT | Mod: CPTII,S$GLB,, | Performed by: OPTOMETRIST

## 2021-08-06 PROCEDURE — 1160F PR REVIEW ALL MEDS BY PRESCRIBER/CLIN PHARMACIST DOCUMENTED: ICD-10-PCS | Mod: CPTII,S$GLB,, | Performed by: OPTOMETRIST

## 2021-08-06 PROCEDURE — 1160F RVW MEDS BY RX/DR IN RCRD: CPT | Mod: CPTII,S$GLB,, | Performed by: OPTOMETRIST

## 2021-12-01 LAB — PAP RECOMMENDATION EXT: NORMAL

## 2022-08-09 ENCOUNTER — OFFICE VISIT (OUTPATIENT)
Dept: OPHTHALMOLOGY | Facility: CLINIC | Age: 37
End: 2022-08-09
Payer: COMMERCIAL

## 2022-08-09 DIAGNOSIS — G93.2 PSEUDOTUMOR CEREBRI: Primary | ICD-10-CM

## 2022-08-09 DIAGNOSIS — H52.203 MYOPIA OF BOTH EYES WITH ASTIGMATISM: ICD-10-CM

## 2022-08-09 DIAGNOSIS — H52.13 MYOPIA OF BOTH EYES WITH ASTIGMATISM: ICD-10-CM

## 2022-08-09 PROCEDURE — 92014 PR EYE EXAM, EST PATIENT,COMPREHESV: ICD-10-PCS | Mod: S$GLB,,, | Performed by: OPTOMETRIST

## 2022-08-09 PROCEDURE — 92133 CPTRZD OPH DX IMG PST SGM ON: CPT | Mod: S$GLB,ICN,, | Performed by: OPTOMETRIST

## 2022-08-09 PROCEDURE — 1159F MED LIST DOCD IN RCRD: CPT | Mod: CPTII,S$GLB,, | Performed by: OPTOMETRIST

## 2022-08-09 PROCEDURE — 99999 PR PBB SHADOW E&M-EST. PATIENT-LVL II: CPT | Mod: PBBFAC,,, | Performed by: OPTOMETRIST

## 2022-08-09 PROCEDURE — 92014 COMPRE OPH EXAM EST PT 1/>: CPT | Mod: S$GLB,,, | Performed by: OPTOMETRIST

## 2022-08-09 PROCEDURE — 99999 PR PBB SHADOW E&M-EST. PATIENT-LVL II: ICD-10-PCS | Mod: PBBFAC,,, | Performed by: OPTOMETRIST

## 2022-08-09 PROCEDURE — 1160F RVW MEDS BY RX/DR IN RCRD: CPT | Mod: CPTII,S$GLB,, | Performed by: OPTOMETRIST

## 2022-08-09 PROCEDURE — 1160F PR REVIEW ALL MEDS BY PRESCRIBER/CLIN PHARMACIST DOCUMENTED: ICD-10-PCS | Mod: CPTII,S$GLB,, | Performed by: OPTOMETRIST

## 2022-08-09 PROCEDURE — 92133 POSTERIOR SEGMENT OCT OPTIC NERVE(OCULAR COHERENCE TOMOGRAPHY) - OU - BOTH EYES: ICD-10-PCS | Mod: S$GLB,ICN,, | Performed by: OPTOMETRIST

## 2022-08-09 PROCEDURE — 1159F PR MEDICATION LIST DOCUMENTED IN MEDICAL RECORD: ICD-10-PCS | Mod: CPTII,S$GLB,, | Performed by: OPTOMETRIST

## 2022-08-09 PROCEDURE — 92015 PR REFRACTION: ICD-10-PCS | Mod: S$GLB,,, | Performed by: OPTOMETRIST

## 2022-08-09 PROCEDURE — 92015 DETERMINE REFRACTIVE STATE: CPT | Mod: S$GLB,,, | Performed by: OPTOMETRIST

## 2022-08-09 NOTE — PROGRESS NOTES
HPI     Annual Exam     Comments: Vision changes since last eye exam?: yes  Any eye pain today: tired   Other ocular symptoms: no  Interested in contact lens fitting today? yes                      Last edited by Danielle Pan MA on 8/9/2022  7:55 AM. (History)            Assessment /Plan     For exam results, see Encounter Report.    Pseudotumor cerebri  -     Posterior Segment OCT Optic Nerve- Both eyes  Stable va today OU  Overall stable ONH and OCT today OD, OS  RTC for VF    Myopia of both eyes with astigmatism  Eyeglass Final Rx     Eyeglass Final Rx       Sphere Cylinder Axis    Right -2.00 +0.50 020    Left -3.00 +0.50 175    Expiration Date: 8/9/2023              Contact Lens Prescription (8/9/2022)        Brand Base Curve Diameter Sphere    Right Air Optix Plus HydraGlyde 8.6 14.2 -1.75    Left Air Optix Plus HydraGlyde 8.6 14.2 -2.75    Expiration Date: 8/9/2023    Replacement: Monthly    Wearing Schedule: Daily Wear          Dispensed trial contact lenses today. Patient is to wear lenses for 1 week.   Ok to order supply if no problems. RTC PRN if any problems arise.    Otherwise, RTC next available for 24-2VF and review.  Discussed above and answered questions.

## 2022-08-10 ENCOUNTER — PATIENT MESSAGE (OUTPATIENT)
Dept: OPTOMETRY | Facility: CLINIC | Age: 37
End: 2022-08-10
Payer: COMMERCIAL

## 2022-10-06 ENCOUNTER — OFFICE VISIT (OUTPATIENT)
Dept: INTERNAL MEDICINE | Facility: CLINIC | Age: 37
End: 2022-10-06
Payer: COMMERCIAL

## 2022-10-06 ENCOUNTER — LAB VISIT (OUTPATIENT)
Dept: LAB | Facility: HOSPITAL | Age: 37
End: 2022-10-06
Attending: NURSE PRACTITIONER
Payer: COMMERCIAL

## 2022-10-06 VITALS
HEART RATE: 87 BPM | DIASTOLIC BLOOD PRESSURE: 80 MMHG | SYSTOLIC BLOOD PRESSURE: 130 MMHG | TEMPERATURE: 97 F | OXYGEN SATURATION: 97 % | BODY MASS INDEX: 38.28 KG/M2 | HEIGHT: 65 IN | WEIGHT: 229.75 LBS

## 2022-10-06 DIAGNOSIS — K21.9 GASTROESOPHAGEAL REFLUX DISEASE, UNSPECIFIED WHETHER ESOPHAGITIS PRESENT: ICD-10-CM

## 2022-10-06 DIAGNOSIS — G43.009 MIGRAINE WITHOUT AURA AND WITHOUT STATUS MIGRAINOSUS, NOT INTRACTABLE: ICD-10-CM

## 2022-10-06 DIAGNOSIS — Z00.00 ROUTINE ADULT HEALTH MAINTENANCE: ICD-10-CM

## 2022-10-06 DIAGNOSIS — Z00.00 ROUTINE ADULT HEALTH MAINTENANCE: Primary | ICD-10-CM

## 2022-10-06 DIAGNOSIS — E66.9 OBESITY (BMI 35.0-39.9 WITHOUT COMORBIDITY): ICD-10-CM

## 2022-10-06 DIAGNOSIS — H47.10 PAPILLEDEMA, RIGHT EYE: ICD-10-CM

## 2022-10-06 PROBLEM — J02.0 PHARYNGITIS, STREPTOCOCCAL, ACUTE: Status: RESOLVED | Noted: 2018-06-18 | Resolved: 2022-10-06

## 2022-10-06 PROBLEM — Z98.51 S/P TUBAL LIGATION: Status: RESOLVED | Noted: 2018-09-19 | Resolved: 2022-10-06

## 2022-10-06 LAB
ALBUMIN SERPL BCP-MCNC: 4.3 G/DL (ref 3.5–5.2)
ALP SERPL-CCNC: 55 U/L (ref 55–135)
ALT SERPL W/O P-5'-P-CCNC: 17 U/L (ref 10–44)
ANION GAP SERPL CALC-SCNC: 9 MMOL/L (ref 8–16)
AST SERPL-CCNC: 11 U/L (ref 10–40)
BASOPHILS # BLD AUTO: 0.02 K/UL (ref 0–0.2)
BASOPHILS NFR BLD: 0.3 % (ref 0–1.9)
BILIRUB SERPL-MCNC: 0.4 MG/DL (ref 0.1–1)
BUN SERPL-MCNC: 13 MG/DL (ref 6–20)
CALCIUM SERPL-MCNC: 10.3 MG/DL (ref 8.7–10.5)
CHLORIDE SERPL-SCNC: 108 MMOL/L (ref 95–110)
CHOLEST SERPL-MCNC: 226 MG/DL (ref 120–199)
CHOLEST/HDLC SERPL: 4.5 {RATIO} (ref 2–5)
CO2 SERPL-SCNC: 25 MMOL/L (ref 23–29)
CREAT SERPL-MCNC: 0.8 MG/DL (ref 0.5–1.4)
DIFFERENTIAL METHOD: NORMAL
EOSINOPHIL # BLD AUTO: 0.2 K/UL (ref 0–0.5)
EOSINOPHIL NFR BLD: 2.3 % (ref 0–8)
ERYTHROCYTE [DISTWIDTH] IN BLOOD BY AUTOMATED COUNT: 11.9 % (ref 11.5–14.5)
EST. GFR  (NO RACE VARIABLE): >60 ML/MIN/1.73 M^2
ESTIMATED AVG GLUCOSE: 100 MG/DL (ref 68–131)
GLUCOSE SERPL-MCNC: 87 MG/DL (ref 70–110)
HBA1C MFR BLD: 5.1 % (ref 4–5.6)
HCT VFR BLD AUTO: 40.5 % (ref 37–48.5)
HDLC SERPL-MCNC: 50 MG/DL (ref 40–75)
HDLC SERPL: 22.1 % (ref 20–50)
HGB BLD-MCNC: 13.5 G/DL (ref 12–16)
IMM GRANULOCYTES # BLD AUTO: 0.02 K/UL (ref 0–0.04)
IMM GRANULOCYTES NFR BLD AUTO: 0.3 % (ref 0–0.5)
LDLC SERPL CALC-MCNC: 155.2 MG/DL (ref 63–159)
LYMPHOCYTES # BLD AUTO: 1.2 K/UL (ref 1–4.8)
LYMPHOCYTES NFR BLD: 18.7 % (ref 18–48)
MCH RBC QN AUTO: 30.3 PG (ref 27–31)
MCHC RBC AUTO-ENTMCNC: 33.3 G/DL (ref 32–36)
MCV RBC AUTO: 91 FL (ref 82–98)
MONOCYTES # BLD AUTO: 0.5 K/UL (ref 0.3–1)
MONOCYTES NFR BLD: 8 % (ref 4–15)
NEUTROPHILS # BLD AUTO: 4.5 K/UL (ref 1.8–7.7)
NEUTROPHILS NFR BLD: 70.4 % (ref 38–73)
NONHDLC SERPL-MCNC: 176 MG/DL
NRBC BLD-RTO: 0 /100 WBC
PLATELET # BLD AUTO: 268 K/UL (ref 150–450)
PMV BLD AUTO: 10.3 FL (ref 9.2–12.9)
POTASSIUM SERPL-SCNC: 4.9 MMOL/L (ref 3.5–5.1)
PROT SERPL-MCNC: 7.9 G/DL (ref 6–8.4)
RBC # BLD AUTO: 4.46 M/UL (ref 4–5.4)
SODIUM SERPL-SCNC: 142 MMOL/L (ref 136–145)
TRIGL SERPL-MCNC: 104 MG/DL (ref 30–150)
WBC # BLD AUTO: 6.41 K/UL (ref 3.9–12.7)

## 2022-10-06 PROCEDURE — 1160F RVW MEDS BY RX/DR IN RCRD: CPT | Mod: CPTII,S$GLB,, | Performed by: NURSE PRACTITIONER

## 2022-10-06 PROCEDURE — 1160F PR REVIEW ALL MEDS BY PRESCRIBER/CLIN PHARMACIST DOCUMENTED: ICD-10-PCS | Mod: CPTII,S$GLB,, | Performed by: NURSE PRACTITIONER

## 2022-10-06 PROCEDURE — 3079F DIAST BP 80-89 MM HG: CPT | Mod: CPTII,S$GLB,, | Performed by: NURSE PRACTITIONER

## 2022-10-06 PROCEDURE — 85025 COMPLETE CBC W/AUTO DIFF WBC: CPT | Mod: PO | Performed by: NURSE PRACTITIONER

## 2022-10-06 PROCEDURE — 80061 LIPID PANEL: CPT | Performed by: NURSE PRACTITIONER

## 2022-10-06 PROCEDURE — 99395 PREV VISIT EST AGE 18-39: CPT | Mod: S$GLB,,, | Performed by: NURSE PRACTITIONER

## 2022-10-06 PROCEDURE — 36415 COLL VENOUS BLD VENIPUNCTURE: CPT | Mod: PO | Performed by: NURSE PRACTITIONER

## 2022-10-06 PROCEDURE — 80053 COMPREHEN METABOLIC PANEL: CPT | Mod: PO | Performed by: NURSE PRACTITIONER

## 2022-10-06 PROCEDURE — 99999 PR PBB SHADOW E&M-EST. PATIENT-LVL IV: CPT | Mod: PBBFAC,,, | Performed by: NURSE PRACTITIONER

## 2022-10-06 PROCEDURE — 3079F PR MOST RECENT DIASTOLIC BLOOD PRESSURE 80-89 MM HG: ICD-10-PCS | Mod: CPTII,S$GLB,, | Performed by: NURSE PRACTITIONER

## 2022-10-06 PROCEDURE — 1159F MED LIST DOCD IN RCRD: CPT | Mod: CPTII,S$GLB,, | Performed by: NURSE PRACTITIONER

## 2022-10-06 PROCEDURE — 3044F HG A1C LEVEL LT 7.0%: CPT | Mod: CPTII,S$GLB,, | Performed by: NURSE PRACTITIONER

## 2022-10-06 PROCEDURE — 99999 PR PBB SHADOW E&M-EST. PATIENT-LVL IV: ICD-10-PCS | Mod: PBBFAC,,, | Performed by: NURSE PRACTITIONER

## 2022-10-06 PROCEDURE — 1159F PR MEDICATION LIST DOCUMENTED IN MEDICAL RECORD: ICD-10-PCS | Mod: CPTII,S$GLB,, | Performed by: NURSE PRACTITIONER

## 2022-10-06 PROCEDURE — 83036 HEMOGLOBIN GLYCOSYLATED A1C: CPT | Performed by: NURSE PRACTITIONER

## 2022-10-06 PROCEDURE — 3075F SYST BP GE 130 - 139MM HG: CPT | Mod: CPTII,S$GLB,, | Performed by: NURSE PRACTITIONER

## 2022-10-06 PROCEDURE — 3075F PR MOST RECENT SYSTOLIC BLOOD PRESS GE 130-139MM HG: ICD-10-PCS | Mod: CPTII,S$GLB,, | Performed by: NURSE PRACTITIONER

## 2022-10-06 PROCEDURE — 99395 PR PREVENTIVE VISIT,EST,18-39: ICD-10-PCS | Mod: S$GLB,,, | Performed by: NURSE PRACTITIONER

## 2022-10-06 PROCEDURE — 3044F PR MOST RECENT HEMOGLOBIN A1C LEVEL <7.0%: ICD-10-PCS | Mod: CPTII,S$GLB,, | Performed by: NURSE PRACTITIONER

## 2022-10-06 NOTE — PROGRESS NOTES
Subjective:       Patient ID: Shruti Mercado is a 37 y.o. female.    Chief Complaint: Annual Exam    Mrs Mercado presents to visit for annual wellness exam. Due for routine labs. Had hip sx a few weeks ago. Does continue to have some mild pain. has upcoming follow up.  Declines flu shot. Had pap smear with GYN at [facility], will get MAURIZIO for this. Concerns for low bg due to a couple episodes of sweats that improve after eating. Intermittently dizziness/lightheadedness with episodes.     Patient Active Problem List   Diagnosis    GERD (gastroesophageal reflux disease)    Pseudotumor cerebri    Migraine without aura and without status migrainosus, not intractable    Obesity (BMI 35.0-39.9 without comorbidity)    Chronic fatigue    Heart murmur    Papilledema, right eye    S/P gastric bypass       Family History   Problem Relation Age of Onset    Hypertension Mother     Hypertension Father     Diabetes Father     Cancer Sister     No Known Problems Daughter     No Known Problems Son     No Known Problems Sister     No Known Problems Sister     No Known Problems Sister     Cancer Paternal Grandfather     Stroke Paternal Grandfather      Past Surgical History:   Procedure Laterality Date    ABSCESS DRAINAGE      ENDOMETRIAL ABLATION  02/22/2019    LAPAROSCOPIC TUBAL LIGATION W/ ANTOINETTE CLIPS  2008    OVARIAN CYST SURGERY      sleeve      TUBAL LIGATION           Current Outpatient Medications:     multivitamin capsule, Take 1 capsule by mouth once daily., Disp: , Rfl:     Review of Systems   Constitutional:  Positive for diaphoresis (2x this week, questionable low BG). Negative for activity change, appetite change, chills, fatigue and fever.   Eyes:  Negative for visual disturbance.   Respiratory:  Negative for cough, chest tightness and shortness of breath.    Cardiovascular:  Negative for chest pain, palpitations and leg swelling.   Gastrointestinal:  Negative for abdominal pain, blood in stool, constipation,  "diarrhea, nausea and vomiting.   Endocrine: Negative for polydipsia, polyphagia and polyuria.   Genitourinary:  Negative for dysuria and frequency.   Musculoskeletal:  Positive for arthralgias. Negative for gait problem and myalgias.   Neurological:  Positive for dizziness and light-headedness. Negative for headaches.   Psychiatric/Behavioral:  Negative for dysphoric mood. The patient is not nervous/anxious.      Objective:   /80 (BP Location: Left arm, Patient Position: Sitting)   Pulse 87   Temp 97.2 °F (36.2 °C)   Ht 5' 4.5" (1.638 m)   Wt 104.2 kg (229 lb 11.5 oz)   LMP  (LMP Unknown)   SpO2 97%   BMI 38.82 kg/m²      Physical Exam  Vitals reviewed.   Constitutional:       General: She is not in acute distress.     Appearance: Normal appearance. She is well-developed. She is obese. She is not ill-appearing or diaphoretic.   HENT:      Head: Normocephalic.      Right Ear: Tympanic membrane normal.      Left Ear: Tympanic membrane normal.      Nose: Nose normal.      Mouth/Throat:      Mouth: Mucous membranes are moist.      Pharynx: Oropharynx is clear. No oropharyngeal exudate.   Eyes:      General:         Right eye: No discharge.         Left eye: No discharge.      Extraocular Movements: Extraocular movements intact.      Conjunctiva/sclera: Conjunctivae normal.      Pupils: Pupils are equal, round, and reactive to light.   Cardiovascular:      Rate and Rhythm: Normal rate and regular rhythm.      Pulses: Normal pulses.      Heart sounds: Normal heart sounds. No murmur heard.    No friction rub. No gallop.   Pulmonary:      Effort: Pulmonary effort is normal. No respiratory distress.      Breath sounds: Normal breath sounds. No rales.   Abdominal:      Palpations: Abdomen is soft.      Tenderness: There is no abdominal tenderness. There is no guarding.   Musculoskeletal:      Cervical back: Normal range of motion and neck supple. No tenderness.      Right lower leg: No edema.      Left lower leg: " "No edema.   Lymphadenopathy:      Cervical: No cervical adenopathy.   Skin:     General: Skin is warm and dry.      Capillary Refill: Capillary refill takes less than 2 seconds.      Coloration: Skin is not pale.      Findings: No erythema.   Neurological:      Mental Status: She is alert and oriented to person, place, and time.   Psychiatric:         Mood and Affect: Mood normal.         Behavior: Behavior normal.       Assessment & Plan     Problem List Items Addressed This Visit          Neuro    Migraine without aura and without status migrainosus, not intractable    Current Assessment & Plan     Not currently on medications. Stable.             Ophtho    Papilledema, right eye    Current Assessment & Plan     Followed by ophthalmology.             Endocrine    Obesity (BMI 35.0-39.9 without comorbidity)    Current Assessment & Plan     Counseled on importance of diet and exercise in order to improve overall quality of life, and reduce risk of future comorbidities.              GI    GERD (gastroesophageal reflux disease)    Current Assessment & Plan     Not currently on medicaitons. Lifestyle modifications.           Other Visit Diagnoses       Routine adult health maintenance    -  Primary    Relevant Orders    CBC Auto Differential (Completed)    COMPREHENSIVE METABOLIC PANEL (Completed)    Lipid Panel (Completed)    Hemoglobin A1C (Completed)        No concerns on physical exam. Routine labs today. Declines flu shot.   Follow up in about 1 year (around 10/6/2023) for annual wellness. .            Portions of this note may have been created with voice recognition software. Occasional "wrong-word" or "sound-a-like" substitutions may have occurred due to the inherent limitations of voice recognition software. Please, read the note carefully and recognize, using context, where substitutions have occurred.         "

## 2022-10-14 ENCOUNTER — PATIENT OUTREACH (OUTPATIENT)
Dept: ADMINISTRATIVE | Facility: HOSPITAL | Age: 37
End: 2022-10-14
Payer: COMMERCIAL

## 2022-10-14 NOTE — PROGRESS NOTES
MAURIZIO signed by patient with no doctor/facility to request from.   Called patient to verify. LVM for patient to call back with information.     Uploaded MAURIZIO PAP SMEAR 10/6/2022 ; faxed to [facility] 1x to request. Remind me set 1 week.

## 2022-10-24 NOTE — PROGRESS NOTES
2nd attempt  MAURIZIO PAP SMEAR 10/6/2022 ; faxed to [facility] 1x to request. Remind me set 1 week.

## 2022-11-21 ENCOUNTER — TELEPHONE (OUTPATIENT)
Dept: NEUROLOGY | Facility: CLINIC | Age: 37
End: 2022-11-21
Payer: COMMERCIAL

## 2022-11-21 NOTE — TELEPHONE ENCOUNTER
----- Message from Naida Chaudhary sent at 11/21/2022  3:14 PM CST -----  Regarding: appt  Contact: pt  Type:  Sooner Appointment Request    Caller is requesting a sooner appointment.  Caller declined first available appointment listed below.  Caller will not accept being placed on the waitlist and is requesting a message be sent to doctor.  Name of Caller:  When is the first available appointment? 01/09  Symptoms: Migraines / med refill / ringing in ears  Would the patient rather a call back or a response via [facility]? Call back  Best Call Back Number: [phone]  Additional Information: n/a

## 2022-11-28 ENCOUNTER — OFFICE VISIT (OUTPATIENT)
Dept: NEUROLOGY | Facility: CLINIC | Age: 37
End: 2022-11-28
Payer: COMMERCIAL

## 2022-11-28 VITALS
SYSTOLIC BLOOD PRESSURE: 123 MMHG | WEIGHT: 230.63 LBS | OXYGEN SATURATION: 99 % | DIASTOLIC BLOOD PRESSURE: 85 MMHG | HEIGHT: 65 IN | RESPIRATION RATE: 16 BRPM | HEART RATE: 90 BPM | BODY MASS INDEX: 38.42 KG/M2

## 2022-11-28 DIAGNOSIS — G43.009 MIGRAINE WITHOUT AURA AND WITHOUT STATUS MIGRAINOSUS, NOT INTRACTABLE: ICD-10-CM

## 2022-11-28 DIAGNOSIS — G93.2 PSEUDOTUMOR CEREBRI: Primary | ICD-10-CM

## 2022-11-28 DIAGNOSIS — E66.9 OBESITY (BMI 35.0-39.9 WITHOUT COMORBIDITY): ICD-10-CM

## 2022-11-28 PROCEDURE — 99999 PR PBB SHADOW E&M-EST. PATIENT-LVL IV: ICD-10-PCS | Mod: PBBFAC,,, | Performed by: NURSE PRACTITIONER

## 2022-11-28 PROCEDURE — 3079F DIAST BP 80-89 MM HG: CPT | Mod: CPTII,S$GLB,, | Performed by: NURSE PRACTITIONER

## 2022-11-28 PROCEDURE — 3079F PR MOST RECENT DIASTOLIC BLOOD PRESSURE 80-89 MM HG: ICD-10-PCS | Mod: CPTII,S$GLB,, | Performed by: NURSE PRACTITIONER

## 2022-11-28 PROCEDURE — 1159F MED LIST DOCD IN RCRD: CPT | Mod: CPTII,S$GLB,, | Performed by: NURSE PRACTITIONER

## 2022-11-28 PROCEDURE — 3008F PR BODY MASS INDEX (BMI) DOCUMENTED: ICD-10-PCS | Mod: CPTII,S$GLB,, | Performed by: NURSE PRACTITIONER

## 2022-11-28 PROCEDURE — 99215 OFFICE O/P EST HI 40 MIN: CPT | Mod: S$GLB,,, | Performed by: NURSE PRACTITIONER

## 2022-11-28 PROCEDURE — 1159F PR MEDICATION LIST DOCUMENTED IN MEDICAL RECORD: ICD-10-PCS | Mod: CPTII,S$GLB,, | Performed by: NURSE PRACTITIONER

## 2022-11-28 PROCEDURE — 99215 PR OFFICE/OUTPT VISIT, EST, LEVL V, 40-54 MIN: ICD-10-PCS | Mod: S$GLB,,, | Performed by: NURSE PRACTITIONER

## 2022-11-28 PROCEDURE — 3044F HG A1C LEVEL LT 7.0%: CPT | Mod: CPTII,S$GLB,, | Performed by: NURSE PRACTITIONER

## 2022-11-28 PROCEDURE — 3074F PR MOST RECENT SYSTOLIC BLOOD PRESSURE < 130 MM HG: ICD-10-PCS | Mod: CPTII,S$GLB,, | Performed by: NURSE PRACTITIONER

## 2022-11-28 PROCEDURE — 1160F PR REVIEW ALL MEDS BY PRESCRIBER/CLIN PHARMACIST DOCUMENTED: ICD-10-PCS | Mod: CPTII,S$GLB,, | Performed by: NURSE PRACTITIONER

## 2022-11-28 PROCEDURE — 3008F BODY MASS INDEX DOCD: CPT | Mod: CPTII,S$GLB,, | Performed by: NURSE PRACTITIONER

## 2022-11-28 PROCEDURE — 3044F PR MOST RECENT HEMOGLOBIN A1C LEVEL <7.0%: ICD-10-PCS | Mod: CPTII,S$GLB,, | Performed by: NURSE PRACTITIONER

## 2022-11-28 PROCEDURE — 99999 PR PBB SHADOW E&M-EST. PATIENT-LVL IV: CPT | Mod: PBBFAC,,, | Performed by: NURSE PRACTITIONER

## 2022-11-28 PROCEDURE — 1160F RVW MEDS BY RX/DR IN RCRD: CPT | Mod: CPTII,S$GLB,, | Performed by: NURSE PRACTITIONER

## 2022-11-28 PROCEDURE — 3074F SYST BP LT 130 MM HG: CPT | Mod: CPTII,S$GLB,, | Performed by: NURSE PRACTITIONER

## 2022-11-28 RX ORDER — TOPIRAMATE 25 MG/1
TABLET ORAL
Qty: 60 TABLET | Refills: 11 | Status: SHIPPED | OUTPATIENT
Start: 2022-11-28 | End: 2023-01-09 | Stop reason: SDUPTHER

## 2022-11-28 NOTE — PROGRESS NOTES
"Subjective:       Patient ID: Shruti Mercado is a 37 y.o. female.    Chief Complaint: Follow-up    HPI       BACKGROUND HISTORY      The patient was referred by Dr. Espinosa for evaluation.    The patient has been having holocephalic throbbing 8-10/10 headaches lasting for 24 hours and associated with nausea and light sensitivity, visual obscurations and tinnitus for 2-3 years. In 2017, she was found to have B/L papilledema with unremarkable Brain MRI/MRA and OP of 20 cm H2O. She is on Diamox 500 mg BID but she takes it QOD because she does not like the SEs of numbness though it helps with her symptoms. The diagnosis was questionable. Tried to change to TPM XR (Trokendi). Have not seen her since 2018 and today is 01-. She rarely takes Diamox 500 mg (once or twice a week) due to SEs. Dr. Espinosa added TPM and she only takes 25 mg QHS which seems to help with her headaches. Her last eye exam was on 05- and showed 1+ papilledema. Denies loss of vision and headaches have improved. Remains concerned about IC lesion because of family history. Had a spell of lightheadedness 1 week ago () and become more concerned.      Interval History 11/28/2022: Established with Dr. Harry, new to me. Patient presents to appointment unaccompanied  to discuss headaches. Patient states her headaches have worsened over the last 3-4 weeks (October 2022). She is having a lot of pressure in her head and tinnitus. Also having "migraines" once a week with sound and light sensitivity lasting 1-1.5 days. States she has been out of Diamox for a couple months (September 2022). States she was taking Diamox every other day because she could not tolerate the adverse effects. The medication made her feel sluggish and caused tingling. Did not find decreasing the dose to help with adverse effects. Did feel that Diamox helped with pressure. No longer taking TPM. Takes Excedrin migraine about once a week, which helps to eases the headache but " does not abort headache. States she is having blurred vision and double vision at times. Denies loss of vision. Last eye exam was on 8-9-2022 and showed 1+ papilledema. 02- Brain MRI WWO NL. Brain MRV  NL.        Review of Systems   Constitutional:  Negative for appetite change and fatigue.   HENT:  Positive for tinnitus. Negative for hearing loss.    Eyes:  Positive for photophobia and visual disturbance.   Respiratory:  Negative for apnea and shortness of breath.    Cardiovascular:  Negative for chest pain and palpitations.   Gastrointestinal:  Negative for nausea and vomiting.   Endocrine: Negative for cold intolerance and heat intolerance.   Genitourinary:  Negative for difficulty urinating and urgency.   Musculoskeletal:  Negative for arthralgias, back pain, gait problem, joint swelling, myalgias, neck pain and neck stiffness.          Right hip pain   Skin:  Negative for color change and rash.   Allergic/Immunologic: Negative for environmental allergies and immunocompromised state.   Neurological:  Positive for light-headedness and headaches. Negative for dizziness, tremors, seizures, syncope, facial asymmetry, speech difficulty, weakness and numbness.   Hematological:  Negative for adenopathy. Does not bruise/bleed easily.   Psychiatric/Behavioral:  Negative for agitation, behavioral problems, confusion, decreased concentration, dysphoric mood, hallucinations, self-injury, sleep disturbance and suicidal ideas. The patient is not hyperactive.        Current Outpatient Medications:     multivitamin capsule, Take 1 capsule by mouth once daily., Disp: , Rfl:     rimegepant 75 mg odt, Take 1 tablet (75 mg total) by mouth as needed for Migraine (max dose 3 doses within 1 week). Place ODT tablet on the tongue; alternatively the ODT tablet may be placed under the tongue, Disp: 16 tablet, Rfl: 5    topiramate (TOPAMAX) 25 MG tablet, Take 1/2 tablet twice a day for 1 week then full tablet twice a day  thereafter., Disp: 60 tablet, Rfl: 11  Past Medical History:   Diagnosis Date    Migraines     Morbid obesity      Past Surgical History:   Procedure Laterality Date    ABSCESS DRAINAGE      ENDOMETRIAL ABLATION  02/22/2019    LAPAROSCOPIC TUBAL LIGATION W/ ANTOINETTE CLIPS  2008    OVARIAN CYST SURGERY      sleeve      TUBAL LIGATION       Social History     Socioeconomic History    Marital status:    Tobacco Use    Smoking status: Never    Smokeless tobacco: Never   Substance and Sexual Activity    Alcohol use: No    Drug use: No    Sexual activity: Yes   Social History Narrative    , 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        Objective:     VITAL SIGNS REVIEWED     GENERAL APPEARANCE:     The patient looks comfortable.    No signs of medical or psychiatric distress.    Normal breathing pattern.    No dysmorphic features    Normal eye contact.       GENERAL MEDICAL EXAM:    HEENT:  Head is atraumatic normocephalic. No tender temporal arteries.     Neck and Axillae: No JVD.     No carotid bruits. No thyromegaly. No lymphadenopathy.    Cardiopulmonary: No cyanosis. No tachypnea. Normal respiratory effort.    Clear breath sounds. Normal heart sounds with regular rhythm and no murmurs.    Gastrointestinal:  No stomas or lesions. No hernias.    Abdomen is soft non-tender. No masses or organomegaly.    Skin, Hair and Nails: No pathognonomic skin rash. No neurofibromatosis. No stigmata of autoimmune disease.     Limbs: No varicose veins. No edema.     Symmetric pulses.     Muskoskeletal: No deformities.No signs of longstanding neuropathy. No dislocations or fractures.           No spine tenderness.        Neurologic Exam     Mental Status   Oriented to person, place, and time.   Follows 3 step commands.   Attention: normal. Concentration: normal.   Speech: speech is normal   Level of consciousness: alert  Knowledge: good.   Able to name object. Able to repeat. Normal comprehension.     Cranial Nerves   Cranial nerves II  through XII intact.     CN II   Visual fields full to confrontation.   Visual acuity: normal  Right visual field deficit: none  Left visual field deficit: none     CN III, IV, VI   Pupils are equal, round, and reactive to light.  Extraocular motions are normal.   Right pupil: Size: 2 mm. Shape: regular. Reactivity: brisk. Consensual response: intact. Accommodation: intact.   Left pupil: Size: 2 mm. Shape: regular. Reactivity: brisk. Consensual response: intact. Accommodation: intact.   CN III: no CN III palsy  CN VI: no CN VI palsy  Nystagmus: none   Diplopia: none  Ophthalmoparesis: none  Upgaze: normal  Downgaze: normal  Conjugate gaze: present  Vestibulo-ocular reflex: present    CN V   Facial sensation intact.   Right facial sensation deficit: none  Left facial sensation deficit: none    CN VII   Facial expression full, symmetric.   Right facial weakness: none  Left facial weakness: none    CN VIII   CN VIII normal.   Hearing: intact    CN IX, X   CN IX normal.   CN X normal.   Palate: symmetric    CN XI   CN XI normal.   Right sternocleidomastoid strength: normal  Left sternocleidomastoid strength: normal  Right trapezius strength: normal  Left trapezius strength: normal    CN XII   CN XII normal.   Tongue: not atrophic  Fasciculations: absent  Tongue deviation: none  Mild B/L Papilledema 1+ B/L       Motor Exam   Muscle bulk: normal  Overall muscle tone: normal  Right arm tone: normal  Left arm tone: normal  Right arm pronator drift: absent  Left arm pronator drift: absent  Right leg tone: normal  Left leg tone: normal    Strength   Strength 5/5 throughout.   Right neck flexion: 5/5  Left neck flexion: 5/5  Right neck extension: 5/5  Left neck extension: 5/5  Right deltoid: 5/5  Left deltoid: 5/5  Right biceps: 5/5  Left biceps: 5/5  Right triceps: 5/5  Left triceps: 5/5  Right wrist flexion: 5/5  Left wrist flexion: 5/5  Right wrist extension: 5/5  Left wrist extension: 5/5  Right interossei: 5/5  Left  interossei: 5/5  Right iliopsoas: 5/5  Left iliopsoas: 5/5  Right quadriceps: 5/5  Left quadriceps: 5/5  Right hamstrin/5  Left hamstrin/5  Right glutei: 5/5  Left glutei: 5/5  Right anterior tibial: 5/5  Left anterior tibial: 5/5  Right posterior tibial: 5/5  Left posterior tibial: 5/5  Right peroneal: 5/5  Left peroneal: 5/5  Right gastroc: 5/5  Left gastroc: 5/5    Sensory Exam   Light touch normal.   Right arm light touch: normal  Left arm light touch: normal  Right leg light touch: normal  Left leg light touch: normal  Vibration normal.   Right arm vibration: normal  Left arm vibration: normal  Right leg vibration: normal  Left leg vibration: normal  Proprioception normal.   Right arm proprioception: normal  Left arm proprioception: normal  Right leg proprioception: normal  Left leg proprioception: normal  Pinprick normal.   Right arm pinprick: normal  Left arm pinprick: normal  Right leg pinprick: normal  Left leg pinprick: normal  Graphesthesia: normal  Stereognosis: normal    Gait, Coordination, and Reflexes     Gait  Gait: normal    Coordination   Romberg: negative  Finger to nose coordination: normal  Heel to shin coordination: normal  Tandem walking coordination: normal    Tremor   Resting tremor: absent  Intention tremor: absent  Action tremor: absent    Reflexes   Right brachioradialis: 2+  Left brachioradialis: 2+  Right biceps: 2+  Left biceps: 2+  Right triceps: 2+  Left triceps: 2+  Right patellar: 2+  Left patellar: 2+  Right achilles: 2+  Left achilles: 2+  Right plantar: normal  Left plantar: normal  Right Kumari: absent  Left Kumari: absent  Right ankle clonus: absent  Left ankle clonus: absent  Right pendular knee jerk: absent  Left pendular knee jerk: absent    Lab Results   Component Value Date    WBC 6.41 10/06/2022    HGB 13.5 10/06/2022    HCT 40.5 10/06/2022    MCV 91 10/06/2022     10/06/2022     Sodium   Date Value Ref Range Status   10/06/2022 142 136 - 145 mmol/L  Final     Potassium   Date Value Ref Range Status   10/06/2022 4.9 3.5 - 5.1 mmol/L Final     Chloride   Date Value Ref Range Status   10/06/2022 108 95 - 110 mmol/L Final     CO2   Date Value Ref Range Status   10/06/2022 25 23 - 29 mmol/L Final     Glucose   Date Value Ref Range Status   10/06/2022 87 70 - 110 mg/dL Final     BUN   Date Value Ref Range Status   10/06/2022 13 6 - 20 mg/dL Final     Creatinine   Date Value Ref Range Status   10/06/2022 0.8 0.5 - 1.4 mg/dL Final     Calcium   Date Value Ref Range Status   10/06/2022 10.3 8.7 - 10.5 mg/dL Final     Total Protein   Date Value Ref Range Status   10/06/2022 7.9 6.0 - 8.4 g/dL Final     Albumin   Date Value Ref Range Status   10/06/2022 4.3 3.5 - 5.2 g/dL Final     Total Bilirubin   Date Value Ref Range Status   10/06/2022 0.4 0.1 - 1.0 mg/dL Final     Comment:     For infants and newborns, interpretation of results should be based  on gestational age, weight and in agreement with clinical  observations.    Premature Infant recommended reference ranges:  Up to 24 hours.............<8.0 mg/dL  Up to 48 hours............<12.0 mg/dL  3-5 days..................<15.0 mg/dL  6-29 days.................<15.0 mg/dL       Alkaline Phosphatase   Date Value Ref Range Status   10/06/2022 55 55 - 135 U/L Final     AST   Date Value Ref Range Status   10/06/2022 11 10 - 40 U/L Final     ALT   Date Value Ref Range Status   10/06/2022 17 10 - 44 U/L Final     Anion Gap   Date Value Ref Range Status   10/06/2022 9 8 - 16 mmol/L Final     eGFR if    Date Value Ref Range Status   09/08/2020 >60.0 >60 mL/min/1.73 m^2 Final     eGFR if non    Date Value Ref Range Status   09/08/2020 >60.0 >60 mL/min/1.73 m^2 Final     Comment:     Calculation used to obtain the estimated glomerular filtration  rate (eGFR) is the CKD-EPI equation.        Lab Results   Component Value Date    UCHESQUL16 696 10/22/2015     Lab Results   Component Value Date    TSH  2.516 09/08/2020    FREET4 0.78 08/07/2015     2017 RECORDS     Brain MRI MRA NL    CSF OP 20 cmH2O???    02-     Brain MRI WWO NL     Brain MRV  NL    Reviewed the neuroimaging independently       Assessment:       1. Pseudotumor cerebri    2. Migraine without aura and without status migrainosus, not intractable    3. Obesity (BMI 35.0-39.9 without comorbidity)          Plan:         QUESTIONABLE PTC/IIH WITH INTOLERANCE TO PHARMACOTHERAPY/ POSSIBLY MIXED WITH MIGRAINE    S/P BTL       The patient is not interested in repeating LP at this time.    If she has PTC/IIH she is basically untreated.    Since patient has been noncompliant with diamox due to adverse effects, will change to TPM 25 mg BID.    Try Nurtec PRN    Has failed Imitrex and Maxalt in the past          MEDICAL/SURGICAL COMORBIDITIES     All relevant medical comorbidities noted and managed by primary care physician and medical care team.        HEALTHY LIFESTYLE AND PREVENTATIVE CARE     Encouraged the patient to adhere to the age-appropriate health maintenance guidelines including screening tests and vaccinations.      Discussed the overall importance of healthy lifestyle, optimal weight, exercise, healthy diet, good sleep hygiene and avoiding drugs including smoking, alcohol and recreational drugs. The patient verbalized full understanding.         Advised the patient to follow COVID-19 prevention measures.         RTC in 8 weeks         Please do not hesitate to contact me with any updates, questions or concerns.            Brea De Los Santos, MSN, NP    Collaborating Provider: Shahla Harry MD, FAAN Neurologist/Epileptologist             I spent a total of 46 minutes on the day of the visit.  This includes face to face time and non-face to face time preparing to see the patient (eg, review of tests), obtaining and/or reviewing separately obtained history, documenting clinical information in the electronic or other health record, independently  interpreting results and communicating results to the patient/family/caregiver, or care coordinator.

## 2023-01-09 ENCOUNTER — OFFICE VISIT (OUTPATIENT)
Dept: NEUROLOGY | Facility: CLINIC | Age: 38
End: 2023-01-09
Payer: COMMERCIAL

## 2023-01-09 VITALS
OXYGEN SATURATION: 99 % | RESPIRATION RATE: 16 BRPM | HEART RATE: 86 BPM | WEIGHT: 231.69 LBS | BODY MASS INDEX: 38.6 KG/M2 | DIASTOLIC BLOOD PRESSURE: 84 MMHG | HEIGHT: 65 IN | SYSTOLIC BLOOD PRESSURE: 132 MMHG

## 2023-01-09 DIAGNOSIS — R53.82 CHRONIC FATIGUE: ICD-10-CM

## 2023-01-09 DIAGNOSIS — Z98.84 S/P GASTRIC BYPASS: ICD-10-CM

## 2023-01-09 DIAGNOSIS — E66.9 OBESITY (BMI 35.0-39.9 WITHOUT COMORBIDITY): ICD-10-CM

## 2023-01-09 DIAGNOSIS — K21.9 GASTROESOPHAGEAL REFLUX DISEASE, UNSPECIFIED WHETHER ESOPHAGITIS PRESENT: ICD-10-CM

## 2023-01-09 DIAGNOSIS — G43.009 MIGRAINE WITHOUT AURA AND WITHOUT STATUS MIGRAINOSUS, NOT INTRACTABLE: Primary | ICD-10-CM

## 2023-01-09 DIAGNOSIS — G44.209 TENSION HEADACHE: ICD-10-CM

## 2023-01-09 PROBLEM — H47.10: Status: RESOLVED | Noted: 2017-06-12 | Resolved: 2023-01-09

## 2023-01-09 PROBLEM — G93.2 PSEUDOTUMOR CEREBRI: Status: RESOLVED | Noted: 2018-08-02 | Resolved: 2023-01-09

## 2023-01-09 PROCEDURE — 3079F DIAST BP 80-89 MM HG: CPT | Mod: CPTII,S$GLB,, | Performed by: PSYCHIATRY & NEUROLOGY

## 2023-01-09 PROCEDURE — 1159F PR MEDICATION LIST DOCUMENTED IN MEDICAL RECORD: ICD-10-PCS | Mod: CPTII,S$GLB,, | Performed by: PSYCHIATRY & NEUROLOGY

## 2023-01-09 PROCEDURE — 1159F MED LIST DOCD IN RCRD: CPT | Mod: CPTII,S$GLB,, | Performed by: PSYCHIATRY & NEUROLOGY

## 2023-01-09 PROCEDURE — 99999 PR PBB SHADOW E&M-EST. PATIENT-LVL III: CPT | Mod: PBBFAC,,, | Performed by: PSYCHIATRY & NEUROLOGY

## 2023-01-09 PROCEDURE — 3079F PR MOST RECENT DIASTOLIC BLOOD PRESSURE 80-89 MM HG: ICD-10-PCS | Mod: CPTII,S$GLB,, | Performed by: PSYCHIATRY & NEUROLOGY

## 2023-01-09 PROCEDURE — 99999 PR PBB SHADOW E&M-EST. PATIENT-LVL III: ICD-10-PCS | Mod: PBBFAC,,, | Performed by: PSYCHIATRY & NEUROLOGY

## 2023-01-09 PROCEDURE — 3075F PR MOST RECENT SYSTOLIC BLOOD PRESS GE 130-139MM HG: ICD-10-PCS | Mod: CPTII,S$GLB,, | Performed by: PSYCHIATRY & NEUROLOGY

## 2023-01-09 PROCEDURE — 3008F BODY MASS INDEX DOCD: CPT | Mod: CPTII,S$GLB,, | Performed by: PSYCHIATRY & NEUROLOGY

## 2023-01-09 PROCEDURE — 3008F PR BODY MASS INDEX (BMI) DOCUMENTED: ICD-10-PCS | Mod: CPTII,S$GLB,, | Performed by: PSYCHIATRY & NEUROLOGY

## 2023-01-09 PROCEDURE — 99215 OFFICE O/P EST HI 40 MIN: CPT | Mod: S$GLB,,, | Performed by: PSYCHIATRY & NEUROLOGY

## 2023-01-09 PROCEDURE — 3075F SYST BP GE 130 - 139MM HG: CPT | Mod: CPTII,S$GLB,, | Performed by: PSYCHIATRY & NEUROLOGY

## 2023-01-09 PROCEDURE — 99215 PR OFFICE/OUTPT VISIT, EST, LEVL V, 40-54 MIN: ICD-10-PCS | Mod: S$GLB,,, | Performed by: PSYCHIATRY & NEUROLOGY

## 2023-01-09 RX ORDER — TOPIRAMATE 50 MG/1
50 TABLET, FILM COATED ORAL 2 TIMES DAILY
Qty: 186 TABLET | Refills: 3 | Status: SHIPPED | OUTPATIENT
Start: 2023-01-09 | End: 2023-10-30

## 2023-01-09 NOTE — PROGRESS NOTES
Subjective:       Patient ID: Shruti Mercado is a 37 y.o. female.    Chief Complaint: pseudotumor cerebri  and Tinnitus    HPI       BACKGROUND HISTORY      The patient was referred by Dr. Espinosa for evaluation.    The patient has been having holocephalic throbbing 8-10/10 headaches lasting for 24 hours and associated with nausea and light sensitivity, visual obscurations and tinnitus for 2-3 years. In 2017 she was found to have B/L papilledema with unremarkable Brain MRI/MRA and OP of 20 cm H2O. She is on Diamox 500 mg BID but she takes it QOD because he does not like the SEs of numbness though it helps with her symptoms. The diagnosis was questionable. Tried to change to TPM XR (Trokendi) XR.   Have not seen her since 2018 (2018 to 2021) and today is 01-. She was rarely taking Diamox 500 mg (once or twice a week) due to SEs. Dr. Espinosa added TPM and she was only taking TPM 25 mg QHS which seems to help with her headaches. Her last eye exam was on 05- and showed 1+ papilledema. Denies loss of vision and headaches have improved. Remained concerned about IC lesion because of family history. The patient did not want to revisit the diagnosis.If she has PTC/IIH she was basically doing well untreated. Tried to change Diamox (AZM) to 125 mg TID to help with SEs and compliance and consistent use. Explained to her my concern with TPM-AZM combination. Ordered Brain MRI WWO and MRV WWO.     INTERVAL HISTORY         On 02- Brain MRI WWO NL. Brain MRV  NL. Due to non-compliance with Diamox (AZM), she was prescribed TPM 25 mg titration to BID.  She stopped the titration after she was prescribed antibiotics. Nurtec 75 mg ODT has helped tremendously aborting the occasional  migraines headaches. Does endorse frontal and occipital tension headaches.         Review of Systems   Constitutional:  Negative for appetite change and fatigue.   HENT:  Negative for hearing loss and tinnitus.    Eyes:  Negative for  photophobia and visual disturbance.   Respiratory:  Negative for apnea and shortness of breath.    Cardiovascular:  Negative for chest pain and palpitations.   Gastrointestinal:  Negative for nausea and vomiting.   Endocrine: Negative for cold intolerance and heat intolerance.   Genitourinary:  Negative for difficulty urinating and urgency.   Musculoskeletal:  Negative for arthralgias, back pain, gait problem, joint swelling, myalgias, neck pain and neck stiffness.   Skin:  Negative for color change and rash.   Allergic/Immunologic: Negative for environmental allergies and immunocompromised state.   Neurological:  Positive for headaches. Negative for dizziness, tremors, seizures, syncope, facial asymmetry, speech difficulty, weakness, light-headedness and numbness.   Hematological:  Negative for adenopathy. Does not bruise/bleed easily.   Psychiatric/Behavioral:  Negative for agitation, behavioral problems, confusion, decreased concentration, dysphoric mood, hallucinations, self-injury, sleep disturbance and suicidal ideas. The patient is not hyperactive.          Current Outpatient Medications:     multivitamin capsule, Take 1 capsule by mouth once daily., Disp: , Rfl:     rimegepant 75 mg odt, Take 1 tablet (75 mg total) by mouth as needed for Migraine (max dose 3 doses within 1 week). Place ODT tablet on the tongue; alternatively the ODT tablet may be placed under the tongue, Disp: 16 tablet, Rfl: 5    topiramate (TOPAMAX) 25 MG tablet, Take 1/2 tablet twice a day for 1 week then full tablet twice a day thereafter., Disp: 60 tablet, Rfl: 11  Past Medical History:   Diagnosis Date    Migraines     Morbid obesity      Past Surgical History:   Procedure Laterality Date    ABSCESS DRAINAGE      ENDOMETRIAL ABLATION  02/22/2019    HIP SURGERY Right     LAPAROSCOPIC TUBAL LIGATION W/ ANTOINETTE ROSS  2008    OVARIAN CYST SURGERY      sleeve      TUBAL LIGATION       Social History     Socioeconomic History    Marital  status:    Tobacco Use    Smoking status: Never    Smokeless tobacco: Never   Substance and Sexual Activity    Alcohol use: No    Drug use: No    Sexual activity: Yes   Social History Narrative    , 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        Objective:     VITAL SIGNS REVIEWED         GENERAL APPEARANCE:         The patient looks comfortable.    BMI 39.16     No signs of medical or psychiatric distress.    Normal breathing pattern.    No dysmorphic features    Normal eye contact.     GENERAL MEDICAL EXAM:      HEENT:  Head is atraumatic normocephalic. No tender temporal arteries.  Fundoscopic exam in normal.     Neck and Axillae: No JVD.    Cardiopulmonary: No cyanosis. No tachypnea. Normal respiratory effort.    Gastrointestinal:  No stomas or lesions. No hernias.    Skin, Hair and Nails: No pathognonomic skin rash. No neurofibromatosis. No stigmata of autoimmune disease.     Limbs: No varicose veins. No edema.     Muskoskeletal: No deformities.No signs of longstanding neuropathy. No dislocations or fractures.        Neurologic Exam     Mental Status   Oriented to person, place, and time.   Follows 3 step commands.   Attention: normal. Concentration: normal.   Speech: speech is normal   Level of consciousness: alert  Able to perform simple calculations.   Able to name object. Able to repeat. Normal comprehension.     Cranial Nerves   Cranial nerves II through XII intact.     CN II   Visual fields full to confrontation.   Visual acuity: normal  Right visual field deficit: none  Left visual field deficit: none     CN III, IV, VI   Pupils are equal, round, and reactive to light.  Extraocular motions are normal.   Right pupil: Size: 2 mm. Shape: regular. Reactivity: brisk. Consensual response: intact. Accommodation: intact.   Left pupil: Size: 2 mm. Shape: regular. Reactivity: brisk. Consensual response: intact. Accommodation: intact.   CN III: no CN III palsy  CN VI: no CN VI palsy  Nystagmus: none   Diplopia:  none  Ophthalmoparesis: none  Upgaze: normal  Downgaze: normal  Conjugate gaze: present  Vestibulo-ocular reflex: present    CN V   Facial sensation intact.   Right facial sensation deficit: none  Left facial sensation deficit: none    CN VII   Facial expression full, symmetric.   Right facial weakness: none  Left facial weakness: none    CN VIII   CN VIII normal.   Hearing: intact    CN IX, X   CN IX normal.   CN X normal.   Palate: symmetric    CN XI   CN XI normal.   Right sternocleidomastoid strength: normal  Left sternocleidomastoid strength: normal  Right trapezius strength: normal  Left trapezius strength: normal    CN XII   CN XII normal.   Tongue: not atrophic  Fasciculations: absent  Tongue deviation: none         Motor Exam   Muscle bulk: normal  Overall muscle tone: normal  Right arm tone: normal  Left arm tone: normal  Right arm pronator drift: absent  Left arm pronator drift: absent  Right leg tone: normal  Left leg tone: normal    Strength   Strength 5/5 throughout.   Right neck flexion: 5/5  Left neck flexion: 5/5  Right neck extension: 5/5  Left neck extension: 5/5  Right deltoid: 5/5  Left deltoid: 5/5  Right biceps: 5/5  Left biceps: 5/5  Right triceps: 5/5  Left triceps: 5/5  Right wrist flexion: 5/5  Left wrist flexion: 5/5  Right wrist extension: 5/5  Left wrist extension: 5/5  Right interossei: 5/5  Left interossei: 5/5  Right iliopsoas: 5/5  Left iliopsoas: 5/5  Right quadriceps: 5/5  Left quadriceps: 5/5  Right hamstrin/5  Left hamstrin/5  Right glutei: 5/5  Left glutei: 5/5  Right anterior tibial: 5/5  Left anterior tibial: 5/5  Right posterior tibial: 5/5  Left posterior tibial: 5/5  Right peroneal: 5/5  Left peroneal: 5/5  Right gastroc: 5/5  Left gastroc: 5/5    Sensory Exam   Light touch normal.   Right arm light touch: normal  Left arm light touch: normal  Right leg light touch: normal  Left leg light touch: normal  Vibration normal.   Right arm vibration: normal  Left arm  vibration: normal  Right leg vibration: normal  Left leg vibration: normal  Proprioception normal.   Right arm proprioception: normal  Left arm proprioception: normal  Right leg proprioception: normal  Left leg proprioception: normal  Pinprick normal.   Right arm pinprick: normal  Left arm pinprick: normal  Right leg pinprick: normal  Left leg pinprick: normal  Graphesthesia: normal  Stereognosis: normal    Gait, Coordination, and Reflexes     Gait  Gait: normal    Coordination   Romberg: negative  Finger to nose coordination: normal  Heel to shin coordination: normal  Tandem walking coordination: normal    Tremor   Resting tremor: absent  Intention tremor: absent  Action tremor: absent    Reflexes   Right brachioradialis: 2+  Left brachioradialis: 2+  Right biceps: 2+  Left biceps: 2+  Right triceps: 2+  Left triceps: 2+  Right patellar: 2+  Left patellar: 2+  Right achilles: 2+  Left achilles: 2+  Right plantar: normal  Left plantar: normal  Right Kumari: absent  Left Kumari: absent  Right ankle clonus: absent  Left ankle clonus: absent  Right pendular knee jerk: absent  Left pendular knee jerk: absent          Lab Results   Component Value Date    WBC 6.41 10/06/2022    HGB 13.5 10/06/2022    HCT 40.5 10/06/2022    MCV 91 10/06/2022     10/06/2022     Sodium   Date Value Ref Range Status   10/06/2022 142 136 - 145 mmol/L Final     Potassium   Date Value Ref Range Status   10/06/2022 4.9 3.5 - 5.1 mmol/L Final     Chloride   Date Value Ref Range Status   10/06/2022 108 95 - 110 mmol/L Final     CO2   Date Value Ref Range Status   10/06/2022 25 23 - 29 mmol/L Final     Glucose   Date Value Ref Range Status   10/06/2022 87 70 - 110 mg/dL Final     BUN   Date Value Ref Range Status   10/06/2022 13 6 - 20 mg/dL Final     Creatinine   Date Value Ref Range Status   10/06/2022 0.8 0.5 - 1.4 mg/dL Final     Calcium   Date Value Ref Range Status   10/06/2022 10.3 8.7 - 10.5 mg/dL Final     Total Protein   Date  Value Ref Range Status   10/06/2022 7.9 6.0 - 8.4 g/dL Final     Albumin   Date Value Ref Range Status   10/06/2022 4.3 3.5 - 5.2 g/dL Final     Total Bilirubin   Date Value Ref Range Status   10/06/2022 0.4 0.1 - 1.0 mg/dL Final     Comment:     For infants and newborns, interpretation of results should be based  on gestational age, weight and in agreement with clinical  observations.    Premature Infant recommended reference ranges:  Up to 24 hours.............<8.0 mg/dL  Up to 48 hours............<12.0 mg/dL  3-5 days..................<15.0 mg/dL  6-29 days.................<15.0 mg/dL       Alkaline Phosphatase   Date Value Ref Range Status   10/06/2022 55 55 - 135 U/L Final     AST   Date Value Ref Range Status   10/06/2022 11 10 - 40 U/L Final     ALT   Date Value Ref Range Status   10/06/2022 17 10 - 44 U/L Final     Anion Gap   Date Value Ref Range Status   10/06/2022 9 8 - 16 mmol/L Final     eGFR if    Date Value Ref Range Status   09/08/2020 >60.0 >60 mL/min/1.73 m^2 Final     eGFR if non    Date Value Ref Range Status   09/08/2020 >60.0 >60 mL/min/1.73 m^2 Final     Comment:     Calculation used to obtain the estimated glomerular filtration  rate (eGFR) is the CKD-EPI equation.        Lab Results   Component Value Date    ZFTINUEC07 696 10/22/2015     Lab Results   Component Value Date    TSH 2.516 09/08/2020    FREET4 0.78 08/07/2015           2017 RECORDS     Brain MRI MRA NL    CSF OP 20 cm H2O???        02-     Brain MRI WWO NL     Brain MRV  NL      02-    Overall the LP is normal with acceptable opening pressure and normal CSF.     In her case I would consider any opening pressure <27.5 normal (her opening pressure was 26).    Reviewed the neuroimaging independently       Assessment:       1. Migraine without aura and without status migrainosus, not intractable    2. S/P gastric bypass    3. Chronic fatigue    4. Obesity (BMI 35.0-39.9 without  comorbidity)    5. Gastroesophageal reflux disease, unspecified whether esophagitis present    6. Tension headache        Plan:           S/P TUBAL LIGATION       CHRONIC MIXED HEADACHES: MIGRAINE WITH TENSION     UNSUBSTANTIATED INTRACRANIAL HYPERTENSION WITH NORMAL OPENING PRESSURE AND PSEUDOPAPILLEDEMA          The patient does not want to revisit the diagnosis via LP.    Restart TPM and titrate slowly to 50 mg BID. Explained to her that TPM will work for migraine, tension and even in lowering ICP. Stressed the importance of compliance.      Continue Nurtec 75 mg ODT PRN.                   MEDICAL/SURGICAL COMORBIDITIES     All relevant medical comorbidities noted and managed by primary care physician and medical care team.        HEALTHY LIFESTYLE AND PREVENTATIVE CARE     Encouraged the patient to adhere to the age-appropriate health maintenance guidelines including screening tests and vaccinations.      Discussed the overall importance of healthy lifestyle, optimal weight, exercise, healthy diet, good sleep hygiene and avoiding drugs including smoking, alcohol and recreational drugs. The patient verbalized full understanding.       RTC in 3 months       Shahla Harry MD, FAAN    Attending Neurologist/Epileptologist         Diplomate, American Board of Psychiatry and Neurology     Diplomate, American Board of Clinical Neurophysiology     Fellow, American Academy of Neurology               I spent a total of 40 minutes on the day of the visit.  This includes face to face time and non-face to face time preparing to see the patient (eg, review of tests), obtaining and/or reviewing separately obtained history, documenting clinical information in the electronic or other health record, independently interpreting results and communicating results to the patient/family/caregiver, or care coordinator.

## 2023-01-09 NOTE — H&P (VIEW-ONLY)
Subjective:       Patient ID: Shruti Mercado is a 37 y.o. female.    Chief Complaint: pseudotumor cerebri  and Tinnitus    HPI       BACKGROUND HISTORY      The patient was referred by Dr. Espinosa for evaluation.    The patient has been having holocephalic throbbing 8-10/10 headaches lasting for 24 hours and associated with nausea and light sensitivity, visual obscurations and tinnitus for 2-3 years. In 2017 she was found to have B/L papilledema with unremarkable Brain MRI/MRA and OP of 20 cm H2O. She is on Diamox 500 mg BID but she takes it QOD because he does not like the SEs of numbness though it helps with her symptoms. The diagnosis was questionable. Tried to change to TPM XR (Trokendi) XR.   Have not seen her since 2018 (2018 to 2021) and today is 01-. She was rarely taking Diamox 500 mg (once or twice a week) due to SEs. Dr. Espinosa added TPM and she was only taking TPM 25 mg QHS which seems to help with her headaches. Her last eye exam was on 05- and showed 1+ papilledema. Denies loss of vision and headaches have improved. Remained concerned about IC lesion because of family history. The patient did not want to revisit the diagnosis.If she has PTC/IIH she was basically doing well untreated. Tried to change Diamox (AZM) to 125 mg TID to help with SEs and compliance and consistent use. Explained to her my concern with TPM-AZM combination. Ordered Brain MRI WWO and MRV WWO.     INTERVAL HISTORY         On 02- Brain MRI WWO NL. Brain MRV  NL. Due to non-compliance with Diamox (AZM), she was prescribed TPM 25 mg titration to BID.  She stopped the titration after she was prescribed antibiotics. Nurtec 75 mg ODT has helped tremendously aborting the occasional  migraines headaches. Does endorse frontal and occipital tension headaches.         Review of Systems   Constitutional:  Negative for appetite change and fatigue.   HENT:  Negative for hearing loss and tinnitus.    Eyes:  Negative for  photophobia and visual disturbance.   Respiratory:  Negative for apnea and shortness of breath.    Cardiovascular:  Negative for chest pain and palpitations.   Gastrointestinal:  Negative for nausea and vomiting.   Endocrine: Negative for cold intolerance and heat intolerance.   Genitourinary:  Negative for difficulty urinating and urgency.   Musculoskeletal:  Negative for arthralgias, back pain, gait problem, joint swelling, myalgias, neck pain and neck stiffness.   Skin:  Negative for color change and rash.   Allergic/Immunologic: Negative for environmental allergies and immunocompromised state.   Neurological:  Positive for headaches. Negative for dizziness, tremors, seizures, syncope, facial asymmetry, speech difficulty, weakness, light-headedness and numbness.   Hematological:  Negative for adenopathy. Does not bruise/bleed easily.   Psychiatric/Behavioral:  Negative for agitation, behavioral problems, confusion, decreased concentration, dysphoric mood, hallucinations, self-injury, sleep disturbance and suicidal ideas. The patient is not hyperactive.          Current Outpatient Medications:     multivitamin capsule, Take 1 capsule by mouth once daily., Disp: , Rfl:     rimegepant 75 mg odt, Take 1 tablet (75 mg total) by mouth as needed for Migraine (max dose 3 doses within 1 week). Place ODT tablet on the tongue; alternatively the ODT tablet may be placed under the tongue, Disp: 16 tablet, Rfl: 5    topiramate (TOPAMAX) 25 MG tablet, Take 1/2 tablet twice a day for 1 week then full tablet twice a day thereafter., Disp: 60 tablet, Rfl: 11  Past Medical History:   Diagnosis Date    Migraines     Morbid obesity      Past Surgical History:   Procedure Laterality Date    ABSCESS DRAINAGE      ENDOMETRIAL ABLATION  02/22/2019    HIP SURGERY Right     LAPAROSCOPIC TUBAL LIGATION W/ ANTOINETTE ROSS  2008    OVARIAN CYST SURGERY      sleeve      TUBAL LIGATION       Social History     Socioeconomic History    Marital  status:    Tobacco Use    Smoking status: Never    Smokeless tobacco: Never   Substance and Sexual Activity    Alcohol use: No    Drug use: No    Sexual activity: Yes   Social History Narrative    , 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        Objective:     VITAL SIGNS REVIEWED         GENERAL APPEARANCE:         The patient looks comfortable.    BMI 39.16     No signs of medical or psychiatric distress.    Normal breathing pattern.    No dysmorphic features    Normal eye contact.     GENERAL MEDICAL EXAM:      HEENT:  Head is atraumatic normocephalic. No tender temporal arteries.  Fundoscopic exam in normal.     Neck and Axillae: No JVD.    Cardiopulmonary: No cyanosis. No tachypnea. Normal respiratory effort.    Gastrointestinal:  No stomas or lesions. No hernias.    Skin, Hair and Nails: No pathognonomic skin rash. No neurofibromatosis. No stigmata of autoimmune disease.     Limbs: No varicose veins. No edema.     Muskoskeletal: No deformities.No signs of longstanding neuropathy. No dislocations or fractures.        Neurologic Exam     Mental Status   Oriented to person, place, and time.   Follows 3 step commands.   Attention: normal. Concentration: normal.   Speech: speech is normal   Level of consciousness: alert  Able to perform simple calculations.   Able to name object. Able to repeat. Normal comprehension.     Cranial Nerves   Cranial nerves II through XII intact.     CN II   Visual fields full to confrontation.   Visual acuity: normal  Right visual field deficit: none  Left visual field deficit: none     CN III, IV, VI   Pupils are equal, round, and reactive to light.  Extraocular motions are normal.   Right pupil: Size: 2 mm. Shape: regular. Reactivity: brisk. Consensual response: intact. Accommodation: intact.   Left pupil: Size: 2 mm. Shape: regular. Reactivity: brisk. Consensual response: intact. Accommodation: intact.   CN III: no CN III palsy  CN VI: no CN VI palsy  Nystagmus: none   Diplopia:  none  Ophthalmoparesis: none  Upgaze: normal  Downgaze: normal  Conjugate gaze: present  Vestibulo-ocular reflex: present    CN V   Facial sensation intact.   Right facial sensation deficit: none  Left facial sensation deficit: none    CN VII   Facial expression full, symmetric.   Right facial weakness: none  Left facial weakness: none    CN VIII   CN VIII normal.   Hearing: intact    CN IX, X   CN IX normal.   CN X normal.   Palate: symmetric    CN XI   CN XI normal.   Right sternocleidomastoid strength: normal  Left sternocleidomastoid strength: normal  Right trapezius strength: normal  Left trapezius strength: normal    CN XII   CN XII normal.   Tongue: not atrophic  Fasciculations: absent  Tongue deviation: none         Motor Exam   Muscle bulk: normal  Overall muscle tone: normal  Right arm tone: normal  Left arm tone: normal  Right arm pronator drift: absent  Left arm pronator drift: absent  Right leg tone: normal  Left leg tone: normal    Strength   Strength 5/5 throughout.   Right neck flexion: 5/5  Left neck flexion: 5/5  Right neck extension: 5/5  Left neck extension: 5/5  Right deltoid: 5/5  Left deltoid: 5/5  Right biceps: 5/5  Left biceps: 5/5  Right triceps: 5/5  Left triceps: 5/5  Right wrist flexion: 5/5  Left wrist flexion: 5/5  Right wrist extension: 5/5  Left wrist extension: 5/5  Right interossei: 5/5  Left interossei: 5/5  Right iliopsoas: 5/5  Left iliopsoas: 5/5  Right quadriceps: 5/5  Left quadriceps: 5/5  Right hamstrin/5  Left hamstrin/5  Right glutei: 5/5  Left glutei: 5/5  Right anterior tibial: 5/5  Left anterior tibial: 5/5  Right posterior tibial: 5/5  Left posterior tibial: 5/5  Right peroneal: 5/5  Left peroneal: 5/5  Right gastroc: 5/5  Left gastroc: 5/5    Sensory Exam   Light touch normal.   Right arm light touch: normal  Left arm light touch: normal  Right leg light touch: normal  Left leg light touch: normal  Vibration normal.   Right arm vibration: normal  Left arm  vibration: normal  Right leg vibration: normal  Left leg vibration: normal  Proprioception normal.   Right arm proprioception: normal  Left arm proprioception: normal  Right leg proprioception: normal  Left leg proprioception: normal  Pinprick normal.   Right arm pinprick: normal  Left arm pinprick: normal  Right leg pinprick: normal  Left leg pinprick: normal  Graphesthesia: normal  Stereognosis: normal    Gait, Coordination, and Reflexes     Gait  Gait: normal    Coordination   Romberg: negative  Finger to nose coordination: normal  Heel to shin coordination: normal  Tandem walking coordination: normal    Tremor   Resting tremor: absent  Intention tremor: absent  Action tremor: absent    Reflexes   Right brachioradialis: 2+  Left brachioradialis: 2+  Right biceps: 2+  Left biceps: 2+  Right triceps: 2+  Left triceps: 2+  Right patellar: 2+  Left patellar: 2+  Right achilles: 2+  Left achilles: 2+  Right plantar: normal  Left plantar: normal  Right Kumari: absent  Left Kumari: absent  Right ankle clonus: absent  Left ankle clonus: absent  Right pendular knee jerk: absent  Left pendular knee jerk: absent          Lab Results   Component Value Date    WBC 6.41 10/06/2022    HGB 13.5 10/06/2022    HCT 40.5 10/06/2022    MCV 91 10/06/2022     10/06/2022     Sodium   Date Value Ref Range Status   10/06/2022 142 136 - 145 mmol/L Final     Potassium   Date Value Ref Range Status   10/06/2022 4.9 3.5 - 5.1 mmol/L Final     Chloride   Date Value Ref Range Status   10/06/2022 108 95 - 110 mmol/L Final     CO2   Date Value Ref Range Status   10/06/2022 25 23 - 29 mmol/L Final     Glucose   Date Value Ref Range Status   10/06/2022 87 70 - 110 mg/dL Final     BUN   Date Value Ref Range Status   10/06/2022 13 6 - 20 mg/dL Final     Creatinine   Date Value Ref Range Status   10/06/2022 0.8 0.5 - 1.4 mg/dL Final     Calcium   Date Value Ref Range Status   10/06/2022 10.3 8.7 - 10.5 mg/dL Final     Total Protein   Date  Value Ref Range Status   10/06/2022 7.9 6.0 - 8.4 g/dL Final     Albumin   Date Value Ref Range Status   10/06/2022 4.3 3.5 - 5.2 g/dL Final     Total Bilirubin   Date Value Ref Range Status   10/06/2022 0.4 0.1 - 1.0 mg/dL Final     Comment:     For infants and newborns, interpretation of results should be based  on gestational age, weight and in agreement with clinical  observations.    Premature Infant recommended reference ranges:  Up to 24 hours.............<8.0 mg/dL  Up to 48 hours............<12.0 mg/dL  3-5 days..................<15.0 mg/dL  6-29 days.................<15.0 mg/dL       Alkaline Phosphatase   Date Value Ref Range Status   10/06/2022 55 55 - 135 U/L Final     AST   Date Value Ref Range Status   10/06/2022 11 10 - 40 U/L Final     ALT   Date Value Ref Range Status   10/06/2022 17 10 - 44 U/L Final     Anion Gap   Date Value Ref Range Status   10/06/2022 9 8 - 16 mmol/L Final     eGFR if    Date Value Ref Range Status   09/08/2020 >60.0 >60 mL/min/1.73 m^2 Final     eGFR if non    Date Value Ref Range Status   09/08/2020 >60.0 >60 mL/min/1.73 m^2 Final     Comment:     Calculation used to obtain the estimated glomerular filtration  rate (eGFR) is the CKD-EPI equation.        Lab Results   Component Value Date    JAGQXTDM45 696 10/22/2015     Lab Results   Component Value Date    TSH 2.516 09/08/2020    FREET4 0.78 08/07/2015           2017 RECORDS     Brain MRI MRA NL    CSF OP 20 cm H2O???        02-     Brain MRI WWO NL     Brain MRV  NL        Reviewed the neuroimaging independently       Assessment:       1. Migraine without aura and without status migrainosus, not intractable    2. S/P gastric bypass    3. Chronic fatigue    4. Obesity (BMI 35.0-39.9 without comorbidity)    5. Gastroesophageal reflux disease, unspecified whether esophagitis present    6. Tension headache        Plan:           S/P TUBAL LIGATION       CHRONIC MIXED HEADACHES: MIGRAINE  WITH TENSION     UNSUBSTANTIATED INTRACRANIAL HYPERTENSION WITH NORMAL OPENING PRESSURE AND PSEUDOPAPILLEDEMA          The patient does not want to revisit the diagnosis via LP.    Restart TPM and titrate slowly to 50 mg BID. Explained to her that TPM will work for migraine, tension and even in lowering ICP. Stressed the importance of compliance.      Continue Nurtec 75 mg ODT PRN.                   MEDICAL/SURGICAL COMORBIDITIES     All relevant medical comorbidities noted and managed by primary care physician and medical care team.        HEALTHY LIFESTYLE AND PREVENTATIVE CARE     Encouraged the patient to adhere to the age-appropriate health maintenance guidelines including screening tests and vaccinations.      Discussed the overall importance of healthy lifestyle, optimal weight, exercise, healthy diet, good sleep hygiene and avoiding drugs including smoking, alcohol and recreational drugs. The patient verbalized full understanding.       RTC in 3 months       Shahla Harry MD, FAAN    Attending Neurologist/Epileptologist         Diplomate, American Board of Psychiatry and Neurology     Diplomate, American Board of Clinical Neurophysiology     Fellow, American Academy of Neurology               I spent a total of 40 minutes on the day of the visit.  This includes face to face time and non-face to face time preparing to see the patient (eg, review of tests), obtaining and/or reviewing separately obtained history, documenting clinical information in the electronic or other health record, independently interpreting results and communicating results to the patient/family/caregiver, or care coordinator.

## 2023-01-24 ENCOUNTER — PATIENT MESSAGE (OUTPATIENT)
Dept: NEUROLOGY | Facility: CLINIC | Age: 38
End: 2023-01-24
Payer: COMMERCIAL

## 2023-01-25 ENCOUNTER — TELEPHONE (OUTPATIENT)
Dept: NEUROLOGY | Facility: CLINIC | Age: 38
End: 2023-01-25
Payer: COMMERCIAL

## 2023-01-25 ENCOUNTER — PATIENT MESSAGE (OUTPATIENT)
Dept: ADMINISTRATIVE | Facility: HOSPITAL | Age: 38
End: 2023-01-25
Payer: COMMERCIAL

## 2023-01-25 DIAGNOSIS — Z12.31 OTHER SCREENING MAMMOGRAM: ICD-10-CM

## 2023-01-25 DIAGNOSIS — G44.52 NDPH (NEW DAILY PERSISTENT HEADACHE): Primary | ICD-10-CM

## 2023-01-25 NOTE — TELEPHONE ENCOUNTER
----- Message from Shahla Harry MD sent at 1/25/2023  4:01 PM CST -----  Orders placed  ----- Message -----  From: Jeanne Chaudhary MA  Sent: 1/25/2023   2:29 PM CST  To: Shahla Harry MD    Pt spoke with radiology , she would like to move fwd with lumbar. Please advise

## 2023-02-03 ENCOUNTER — HOSPITAL ENCOUNTER (OUTPATIENT)
Dept: RADIOLOGY | Facility: HOSPITAL | Age: 38
Discharge: HOME | End: 2023-02-03
Attending: PSYCHIATRY & NEUROLOGY
Payer: COMMERCIAL

## 2023-02-03 DIAGNOSIS — G44.52 NDPH (NEW DAILY PERSISTENT HEADACHE): ICD-10-CM

## 2023-02-03 LAB
CLARITY CSF: CLEAR
COLOR CSF: COLORLESS
CSF TUBE NUMBER: 1
CSF TUBE NUMBER: 1
CSF, COMMENT: NORMAL
GLUCOSE CSF-MCNC: 54 MG/DL (ref 40–70)
PROT CSF-MCNC: 32 MG/DL (ref 15–40)
RBC # CSF: 0 /CU MM
SPECIMEN VOL CSF: 4 ML
WBC # CSF: 1 /CU MM (ref 0–5)

## 2023-02-03 PROCEDURE — 62328 DX LMBR SPI PNXR W/FLUOR/CT: CPT | Performed by: RADIOLOGY

## 2023-02-03 PROCEDURE — 82945 GLUCOSE OTHER FLUID: CPT | Performed by: PSYCHIATRY & NEUROLOGY

## 2023-02-03 PROCEDURE — 89051 BODY FLUID CELL COUNT: CPT | Performed by: PSYCHIATRY & NEUROLOGY

## 2023-02-03 PROCEDURE — 62328 FL LUMBAR PUNCTURE DIAGNOSTIC WITH IMAGING: ICD-10-PCS | Mod: ,,, | Performed by: RADIOLOGY

## 2023-02-03 PROCEDURE — 84157 ASSAY OF PROTEIN OTHER: CPT | Performed by: PSYCHIATRY & NEUROLOGY

## 2023-02-03 NOTE — PROGRESS NOTES
02-    Overall the LP is normal with acceptable opening pressure and normal CSF.     In her case I would consider any opening pressure <27.5 normal (her opening pressure was 26).

## 2023-02-03 NOTE — DISCHARGE SUMMARY
[facility] (Hospital)  Discharge Note  Short Stay    FL LUMBAR PUNCTURE DIAGNOSTIC WITH IMAGING      OUTCOME: Patient tolerated treatment/procedure well without complication and is now ready for discharge.    DISPOSITION: Home or Self Care    FINAL DIAGNOSIS:  <principal problem not specified>    FOLLOWUP: In clinic    DISCHARGE INSTRUCTIONS:  No discharge procedures on file.     TIME SPENT ON DISCHARGE: 15 minutes    Pre Op Diagnosis: NDPH (New daily persistent headache)        Post Op Diagnosis: same     Procedure:  LP    Procedure performed by: Anibal MALAVE, Claudia CHASE     Written Informed Consent Obtained: Yes     Specimen Removed:  yes     Estimated Blood Loss:  minimal     Findings: Local anesthesia and moderate sedation were used.     The patient tolerated the procedure well and there were no complications.      Disposition:  F/U in clinic or with ordering physician    Discharge instructions:  Light activity for 24 hours.  Remove band aid in 24 hours.  No baths (showers are appropriate).      Sterile technique was performed in the lower back, lidocaine was used as a local anesthetic.  OP 26 cm of h2o, 12 ccs clear csf to lab.  CP 15 cm of h2o.  Pt tolerated the procedure well without immediate complications.  Please see radiologist report for details. F/u with PCP and/or ordering physician.

## 2023-02-03 NOTE — INTERVAL H&P NOTE
The patient has been examined and the H&P has been reviewed:    I concur with the findings and changes have been noted since the H&P was written: pt here today for LP        There are no hospital problems to display for this patient.

## 2023-02-06 ENCOUNTER — PATIENT MESSAGE (OUTPATIENT)
Dept: NEUROLOGY | Facility: CLINIC | Age: 38
End: 2023-02-06
Payer: COMMERCIAL

## 2023-02-06 LAB — PATH INTERP FLD-IMP: NORMAL

## 2023-02-06 RX ORDER — METHAZOLAMIDE 50 MG/1
50 TABLET ORAL 2 TIMES DAILY
Qty: 60 TABLET | Refills: 11 | Status: SHIPPED | OUTPATIENT
Start: 2023-02-06 | End: 2024-02-21

## 2023-02-06 NOTE — TELEPHONE ENCOUNTER
Pt wants to know what's a normal pressure ?   She stated that since the procedure she has been great, she wants to know if she can get on a fluid pill or something to stay at this level . She stated she hasn't had to take anything for a headache and her head feel normal (light)

## 2023-02-22 ENCOUNTER — HOSPITAL ENCOUNTER (OUTPATIENT)
Dept: RADIOLOGY | Facility: HOSPITAL | Age: 38
Discharge: HOME | End: 2023-02-22
Attending: NURSE PRACTITIONER
Payer: COMMERCIAL

## 2023-02-22 VITALS — WEIGHT: 231.69 LBS | HEIGHT: 65 IN | BODY MASS INDEX: 38.6 KG/M2

## 2023-02-22 DIAGNOSIS — Z12.31 OTHER SCREENING MAMMOGRAM: ICD-10-CM

## 2023-02-22 PROCEDURE — 77067 SCR MAMMO BI INCL CAD: CPT | Mod: 26,,, | Performed by: RADIOLOGY

## 2023-02-22 PROCEDURE — 77063 MAMMO DIGITAL SCREENING BILAT WITH TOMO: ICD-10-PCS | Mod: 26,,, | Performed by: RADIOLOGY

## 2023-02-22 PROCEDURE — 77063 BREAST TOMOSYNTHESIS BI: CPT | Mod: 26,,, | Performed by: RADIOLOGY

## 2023-02-22 PROCEDURE — 77067 MAMMO DIGITAL SCREENING BILAT WITH TOMO: ICD-10-PCS | Mod: 26,,, | Performed by: RADIOLOGY

## 2023-02-22 PROCEDURE — 77067 SCR MAMMO BI INCL CAD: CPT | Mod: TC,PO

## 2023-02-25 ENCOUNTER — NURSE TRIAGE (OUTPATIENT)
Dept: ADMINISTRATIVE | Facility: CLINIC | Age: 38
End: 2023-02-25
Payer: COMMERCIAL

## 2023-02-25 ENCOUNTER — HOSPITAL ENCOUNTER (OUTPATIENT)
Facility: HOSPITAL | Age: 38
Discharge: HOME | End: 2023-02-27
Attending: EMERGENCY MEDICINE | Admitting: INTERNAL MEDICINE
Payer: COMMERCIAL

## 2023-02-25 DIAGNOSIS — R07.9 CHEST PAIN: ICD-10-CM

## 2023-02-25 DIAGNOSIS — K57.92 DIVERTICULITIS: Primary | ICD-10-CM

## 2023-02-25 DIAGNOSIS — R11.2 NAUSEA AND VOMITING, UNSPECIFIED VOMITING TYPE: ICD-10-CM

## 2023-02-25 LAB
ALBUMIN SERPL BCP-MCNC: 4.3 G/DL (ref 3.5–5.2)
ALP SERPL-CCNC: 59 U/L (ref 55–135)
ALT SERPL W/O P-5'-P-CCNC: 19 U/L (ref 10–44)
ANION GAP SERPL CALC-SCNC: 13 MMOL/L (ref 8–16)
AST SERPL-CCNC: 15 U/L (ref 10–40)
B-HCG UR QL: NEGATIVE
BASOPHILS # BLD AUTO: 0.05 K/UL (ref 0–0.2)
BASOPHILS NFR BLD: 0.3 % (ref 0–1.9)
BILIRUB SERPL-MCNC: 0.2 MG/DL (ref 0.1–1)
BILIRUB UR QL STRIP: NEGATIVE
BUN SERPL-MCNC: 13 MG/DL (ref 6–20)
CALCIUM SERPL-MCNC: 9.5 MG/DL (ref 8.7–10.5)
CHLORIDE SERPL-SCNC: 107 MMOL/L (ref 95–110)
CLARITY UR REFRACT.AUTO: CLEAR
CO2 SERPL-SCNC: 23 MMOL/L (ref 23–29)
COLOR UR AUTO: YELLOW
CREAT SERPL-MCNC: 0.8 MG/DL (ref 0.5–1.4)
DIFFERENTIAL METHOD: ABNORMAL
EOSINOPHIL # BLD AUTO: 0.1 K/UL (ref 0–0.5)
EOSINOPHIL NFR BLD: 0.6 % (ref 0–8)
ERYTHROCYTE [DISTWIDTH] IN BLOOD BY AUTOMATED COUNT: 11.9 % (ref 11.5–14.5)
EST. GFR  (NO RACE VARIABLE): >60 ML/MIN/1.73 M^2
GLUCOSE SERPL-MCNC: 104 MG/DL (ref 70–110)
GLUCOSE UR QL STRIP: NEGATIVE
HCT VFR BLD AUTO: 39.5 % (ref 37–48.5)
HEP C VIRUS HOLD SPECIMEN: NORMAL
HGB BLD-MCNC: 13.1 G/DL (ref 12–16)
HGB UR QL STRIP: NEGATIVE
IMM GRANULOCYTES # BLD AUTO: 0.09 K/UL (ref 0–0.04)
IMM GRANULOCYTES NFR BLD AUTO: 0.5 % (ref 0–0.5)
KETONES UR QL STRIP: NEGATIVE
LACTATE SERPL-SCNC: 2.1 MMOL/L (ref 0.5–2.2)
LEUKOCYTE ESTERASE UR QL STRIP: NEGATIVE
LIPASE SERPL-CCNC: 30 U/L (ref 4–60)
LYMPHOCYTES # BLD AUTO: 1.6 K/UL (ref 1–4.8)
LYMPHOCYTES NFR BLD: 9.1 % (ref 18–48)
MCH RBC QN AUTO: 30.5 PG (ref 27–31)
MCHC RBC AUTO-ENTMCNC: 33.2 G/DL (ref 32–36)
MCV RBC AUTO: 92 FL (ref 82–98)
MONOCYTES # BLD AUTO: 1.3 K/UL (ref 0.3–1)
MONOCYTES NFR BLD: 7.8 % (ref 4–15)
NEUTROPHILS # BLD AUTO: 14 K/UL (ref 1.8–7.7)
NEUTROPHILS NFR BLD: 81.7 % (ref 38–73)
NITRITE UR QL STRIP: NEGATIVE
NRBC BLD-RTO: 0 /100 WBC
PH UR STRIP: 6 [PH] (ref 5–8)
PLATELET # BLD AUTO: 288 K/UL (ref 150–450)
PMV BLD AUTO: 10.2 FL (ref 9.2–12.9)
POTASSIUM SERPL-SCNC: 4 MMOL/L (ref 3.5–5.1)
PROT SERPL-MCNC: 7.7 G/DL (ref 6–8.4)
PROT UR QL STRIP: NEGATIVE
RBC # BLD AUTO: 4.3 M/UL (ref 4–5.4)
SODIUM SERPL-SCNC: 143 MMOL/L (ref 136–145)
SP GR UR STRIP: 1.02 (ref 1–1.03)
URN SPEC COLLECT METH UR: NORMAL
UROBILINOGEN UR STRIP-ACNC: NEGATIVE EU/DL
WBC # BLD AUTO: 17.1 K/UL (ref 3.9–12.7)

## 2023-02-25 PROCEDURE — 25000003 PHARM REV CODE 250: Mod: ER | Performed by: EMERGENCY MEDICINE

## 2023-02-25 PROCEDURE — 96365 THER/PROPH/DIAG IV INF INIT: CPT | Mod: ER

## 2023-02-25 PROCEDURE — 83605 ASSAY OF LACTIC ACID: CPT | Mod: ER | Performed by: EMERGENCY MEDICINE

## 2023-02-25 PROCEDURE — 99285 EMERGENCY DEPT VISIT HI MDM: CPT | Mod: 25,ER

## 2023-02-25 PROCEDURE — 85025 COMPLETE CBC W/AUTO DIFF WBC: CPT | Mod: ER | Performed by: EMERGENCY MEDICINE

## 2023-02-25 PROCEDURE — 25500020 PHARM REV CODE 255: Mod: ER | Performed by: EMERGENCY MEDICINE

## 2023-02-25 PROCEDURE — 63600175 PHARM REV CODE 636 W HCPCS: Mod: ER | Performed by: EMERGENCY MEDICINE

## 2023-02-25 PROCEDURE — 83690 ASSAY OF LIPASE: CPT | Mod: ER | Performed by: EMERGENCY MEDICINE

## 2023-02-25 PROCEDURE — 87040 BLOOD CULTURE FOR BACTERIA: CPT | Performed by: EMERGENCY MEDICINE

## 2023-02-25 PROCEDURE — 80053 COMPREHEN METABOLIC PANEL: CPT | Mod: ER | Performed by: EMERGENCY MEDICINE

## 2023-02-25 PROCEDURE — 96375 TX/PRO/DX INJ NEW DRUG ADDON: CPT | Mod: ER

## 2023-02-25 PROCEDURE — 87389 HIV-1 AG W/HIV-1&-2 AB AG IA: CPT | Performed by: EMERGENCY MEDICINE

## 2023-02-25 PROCEDURE — 81003 URINALYSIS AUTO W/O SCOPE: CPT | Mod: ER | Performed by: EMERGENCY MEDICINE

## 2023-02-25 PROCEDURE — 81025 URINE PREGNANCY TEST: CPT | Mod: ER | Performed by: EMERGENCY MEDICINE

## 2023-02-25 PROCEDURE — 96361 HYDRATE IV INFUSION ADD-ON: CPT | Mod: ER

## 2023-02-25 PROCEDURE — 96376 TX/PRO/DX INJ SAME DRUG ADON: CPT | Mod: ER

## 2023-02-25 PROCEDURE — 86803 HEPATITIS C AB TEST: CPT | Performed by: EMERGENCY MEDICINE

## 2023-02-25 RX ORDER — CIPROFLOXACIN 2 MG/ML
400 INJECTION, SOLUTION INTRAVENOUS
Status: DISCONTINUED | OUTPATIENT
Start: 2023-02-26 | End: 2023-02-27 | Stop reason: HOSPADM

## 2023-02-25 RX ORDER — MORPHINE SULFATE 4 MG/ML
4 INJECTION, SOLUTION INTRAMUSCULAR; INTRAVENOUS EVERY 4 HOURS PRN
Status: DISCONTINUED | OUTPATIENT
Start: 2023-02-25 | End: 2023-02-26

## 2023-02-25 RX ORDER — MORPHINE SULFATE 4 MG/ML
4 INJECTION, SOLUTION INTRAMUSCULAR; INTRAVENOUS
Status: COMPLETED | OUTPATIENT
Start: 2023-02-25 | End: 2023-02-25

## 2023-02-25 RX ORDER — ONDANSETRON 2 MG/ML
4 INJECTION INTRAMUSCULAR; INTRAVENOUS
Status: COMPLETED | OUTPATIENT
Start: 2023-02-25 | End: 2023-02-25

## 2023-02-25 RX ORDER — ACETAMINOPHEN 325 MG/1
650 TABLET ORAL
Status: COMPLETED | OUTPATIENT
Start: 2023-02-25 | End: 2023-02-25

## 2023-02-25 RX ORDER — ONDANSETRON 2 MG/ML
4 INJECTION INTRAMUSCULAR; INTRAVENOUS EVERY 6 HOURS PRN
Status: DISCONTINUED | OUTPATIENT
Start: 2023-02-25 | End: 2023-02-26

## 2023-02-25 RX ORDER — METRONIDAZOLE 500 MG/100ML
500 INJECTION, SOLUTION INTRAVENOUS
Status: DISCONTINUED | OUTPATIENT
Start: 2023-02-26 | End: 2023-02-27 | Stop reason: HOSPADM

## 2023-02-25 RX ADMIN — SODIUM CHLORIDE 1000 ML: 9 INJECTION, SOLUTION INTRAVENOUS at 07:02

## 2023-02-25 RX ADMIN — MORPHINE SULFATE 4 MG: 4 INJECTION INTRAVENOUS at 09:02

## 2023-02-25 RX ADMIN — PIPERACILLIN SODIUM AND TAZOBACTAM SODIUM 4.5 G: 4; .5 INJECTION, POWDER, LYOPHILIZED, FOR SOLUTION INTRAVENOUS at 09:02

## 2023-02-25 RX ADMIN — ONDANSETRON 4 MG: 2 INJECTION INTRAMUSCULAR; INTRAVENOUS at 07:02

## 2023-02-25 RX ADMIN — IOHEXOL 100 ML: 350 INJECTION, SOLUTION INTRAVENOUS at 09:02

## 2023-02-25 RX ADMIN — MORPHINE SULFATE 4 MG: 4 INJECTION INTRAVENOUS at 07:02

## 2023-02-25 RX ADMIN — ACETAMINOPHEN 650 MG: 325 TABLET ORAL at 10:02

## 2023-02-25 NOTE — TELEPHONE ENCOUNTER
"Reason for Disposition   [1] SEVERE pain (e.g., excruciating) AND [2] present > 1 hour   [1] SEVERE pain (e.g., excruciating, scale 8-10) AND [2] present > 1 hour    Additional Information   Negative: Shock suspected (e.g., cold/pale/clammy skin, too weak to stand, low BP, rapid pulse)   Negative: Difficult to awaken or acting confused (e.g., disoriented, slurred speech)   Negative: Passed out (i.e., lost consciousness, collapsed and was not responding)   Negative: Sounds like a life-threatening emergency to the triager   Negative: Chest pain   Negative: Pain is mainly in upper abdomen  (if needed ask: "is it mainly above the belly button?")   Negative: Followed an abdomen (stomach) injury   Negative: [1] Abdominal pain AND [2] pregnant < 20 weeks   Negative: [1] Abdominal pain AND [2] pregnant 20 or more weeks   Negative: [1] Abdominal pain AND [2] postpartum (from 0 to 6 weeks after delivery)   Negative: Passed out (i.e., lost consciousness, collapsed and was not responding)   Negative: Shock suspected (e.g., cold/pale/clammy skin, too weak to stand, low BP, rapid pulse)   Negative: Difficult to awaken or acting confused (e.g., disoriented, slurred speech)   Negative: Sounds like a life-threatening emergency to the triager   Negative: Followed a major injury to the back (e.g., MVA, fall > 10 feet or 3 meters, penetrating injury, etc.)   Negative: Back pain or flank pain during pregnancy   Negative: Upper, mid or lower back pain that occurs mainly in the midline    Protocols used: Abdominal Pain - Female-A-AH, Flank Pain-A-AH    Pt's spouse states she has severe lower left abdominal pain and pain to the left side. States the pain has been present for more than an hour.     Per triage protocol advised to bring pt to the nearest ED for evaluation. Caller verbalized understanding.  "

## 2023-02-25 NOTE — Clinical Note
Diagnosis: Diverticulitis [225375]   Future Attending Provider: TRA WILLOUGHBY [[id number]]   Admitting Provider:: TRA WILLOUGHBY [[id number]]

## 2023-02-26 PROBLEM — K57.92 DIVERTICULITIS: Status: ACTIVE | Noted: 2023-02-26

## 2023-02-26 LAB
ANION GAP SERPL CALC-SCNC: 9 MMOL/L (ref 8–16)
BASOPHILS # BLD AUTO: 0.03 K/UL (ref 0–0.2)
BASOPHILS NFR BLD: 0.2 % (ref 0–1.9)
BUN SERPL-MCNC: 11 MG/DL (ref 6–20)
CALCIUM SERPL-MCNC: 8.6 MG/DL (ref 8.7–10.5)
CHLORIDE SERPL-SCNC: 108 MMOL/L (ref 95–110)
CO2 SERPL-SCNC: 23 MMOL/L (ref 23–29)
CREAT SERPL-MCNC: 0.8 MG/DL (ref 0.5–1.4)
DIFFERENTIAL METHOD: ABNORMAL
EOSINOPHIL # BLD AUTO: 0 K/UL (ref 0–0.5)
EOSINOPHIL NFR BLD: 0.3 % (ref 0–8)
ERYTHROCYTE [DISTWIDTH] IN BLOOD BY AUTOMATED COUNT: 12.1 % (ref 11.5–14.5)
EST. GFR  (NO RACE VARIABLE): >60 ML/MIN/1.73 M^2
GLUCOSE SERPL-MCNC: 101 MG/DL (ref 70–110)
HCT VFR BLD AUTO: 34 % (ref 37–48.5)
HCV AB SERPL QL IA: NEGATIVE
HGB BLD-MCNC: 11.4 G/DL (ref 12–16)
HIV 1+2 AB+HIV1 P24 AG SERPL QL IA: NEGATIVE
IMM GRANULOCYTES # BLD AUTO: 0.07 K/UL (ref 0–0.04)
IMM GRANULOCYTES NFR BLD AUTO: 0.5 % (ref 0–0.5)
LYMPHOCYTES # BLD AUTO: 1.6 K/UL (ref 1–4.8)
LYMPHOCYTES NFR BLD: 12.2 % (ref 18–48)
MCH RBC QN AUTO: 31 PG (ref 27–31)
MCHC RBC AUTO-ENTMCNC: 33.5 G/DL (ref 32–36)
MCV RBC AUTO: 92 FL (ref 82–98)
MONOCYTES # BLD AUTO: 1.3 K/UL (ref 0.3–1)
MONOCYTES NFR BLD: 10 % (ref 4–15)
NEUTROPHILS # BLD AUTO: 10 K/UL (ref 1.8–7.7)
NEUTROPHILS NFR BLD: 76.8 % (ref 38–73)
NRBC BLD-RTO: 0 /100 WBC
PLATELET # BLD AUTO: 231 K/UL (ref 150–450)
PMV BLD AUTO: 10 FL (ref 9.2–12.9)
POTASSIUM SERPL-SCNC: 3.7 MMOL/L (ref 3.5–5.1)
RBC # BLD AUTO: 3.68 M/UL (ref 4–5.4)
SODIUM SERPL-SCNC: 140 MMOL/L (ref 136–145)
WBC # BLD AUTO: 13.06 K/UL (ref 3.9–12.7)

## 2023-02-26 PROCEDURE — 25000003 PHARM REV CODE 250: Performed by: NURSE PRACTITIONER

## 2023-02-26 PROCEDURE — S0030 INJECTION, METRONIDAZOLE: HCPCS | Mod: ER | Performed by: NURSE PRACTITIONER

## 2023-02-26 PROCEDURE — 25000003 PHARM REV CODE 250: Mod: ER | Performed by: STUDENT IN AN ORGANIZED HEALTH CARE EDUCATION/TRAINING PROGRAM

## 2023-02-26 PROCEDURE — 25000003 PHARM REV CODE 250: Performed by: INTERNAL MEDICINE

## 2023-02-26 PROCEDURE — 25000003 PHARM REV CODE 250: Mod: ER | Performed by: EMERGENCY MEDICINE

## 2023-02-26 PROCEDURE — 85025 COMPLETE CBC W/AUTO DIFF WBC: CPT | Mod: ER | Performed by: NURSE PRACTITIONER

## 2023-02-26 PROCEDURE — 96361 HYDRATE IV INFUSION ADD-ON: CPT

## 2023-02-26 PROCEDURE — 80048 BASIC METABOLIC PNL TOTAL CA: CPT | Mod: ER | Performed by: NURSE PRACTITIONER

## 2023-02-26 PROCEDURE — 96366 THER/PROPH/DIAG IV INF ADDON: CPT | Mod: ER

## 2023-02-26 PROCEDURE — 63600175 PHARM REV CODE 636 W HCPCS: Mod: ER | Performed by: NURSE PRACTITIONER

## 2023-02-26 PROCEDURE — 96376 TX/PRO/DX INJ SAME DRUG ADON: CPT

## 2023-02-26 PROCEDURE — 96366 THER/PROPH/DIAG IV INF ADDON: CPT

## 2023-02-26 PROCEDURE — 96376 TX/PRO/DX INJ SAME DRUG ADON: CPT | Mod: ER

## 2023-02-26 PROCEDURE — S0030 INJECTION, METRONIDAZOLE: HCPCS | Performed by: NURSE PRACTITIONER

## 2023-02-26 PROCEDURE — G0378 HOSPITAL OBSERVATION PER HR: HCPCS

## 2023-02-26 PROCEDURE — 96367 TX/PROPH/DG ADDL SEQ IV INF: CPT | Mod: ER

## 2023-02-26 PROCEDURE — 63600175 PHARM REV CODE 636 W HCPCS: Performed by: NURSE PRACTITIONER

## 2023-02-26 PROCEDURE — 25000003 PHARM REV CODE 250: Mod: ER | Performed by: NURSE PRACTITIONER

## 2023-02-26 RX ORDER — SODIUM CHLORIDE 9 MG/ML
INJECTION, SOLUTION INTRAVENOUS
Status: DISCONTINUED | OUTPATIENT
Start: 2023-02-26 | End: 2023-02-27 | Stop reason: HOSPADM

## 2023-02-26 RX ORDER — LANOLIN ALCOHOL/MO/W.PET/CERES
800 CREAM (GRAM) TOPICAL
Status: DISCONTINUED | OUTPATIENT
Start: 2023-02-26 | End: 2023-02-27 | Stop reason: HOSPADM

## 2023-02-26 RX ORDER — POLYETHYLENE GLYCOL 3350 17 G/17G
17 POWDER, FOR SOLUTION ORAL DAILY
Status: DISCONTINUED | OUTPATIENT
Start: 2023-02-27 | End: 2023-02-27 | Stop reason: HOSPADM

## 2023-02-26 RX ORDER — SODIUM CHLORIDE 9 MG/ML
1000 INJECTION, SOLUTION INTRAVENOUS
Status: COMPLETED | OUTPATIENT
Start: 2023-02-26 | End: 2023-02-26

## 2023-02-26 RX ORDER — TOPIRAMATE 25 MG/1
50 TABLET ORAL 2 TIMES DAILY
Status: DISCONTINUED | OUTPATIENT
Start: 2023-02-26 | End: 2023-02-27 | Stop reason: HOSPADM

## 2023-02-26 RX ORDER — ONDANSETRON 2 MG/ML
4 INJECTION INTRAMUSCULAR; INTRAVENOUS EVERY 8 HOURS PRN
Status: DISCONTINUED | OUTPATIENT
Start: 2023-02-26 | End: 2023-02-27 | Stop reason: HOSPADM

## 2023-02-26 RX ORDER — HYDROCODONE BITARTRATE AND ACETAMINOPHEN 5; 325 MG/1; MG/1
1 TABLET ORAL EVERY 6 HOURS PRN
Status: DISCONTINUED | OUTPATIENT
Start: 2023-02-26 | End: 2023-02-27 | Stop reason: HOSPADM

## 2023-02-26 RX ORDER — HYDROCODONE BITARTRATE AND ACETAMINOPHEN 7.5; 325 MG/1; MG/1
1 TABLET ORAL EVERY 6 HOURS PRN
Status: DISCONTINUED | OUTPATIENT
Start: 2023-02-26 | End: 2023-02-27 | Stop reason: HOSPADM

## 2023-02-26 RX ORDER — NALOXONE HCL 0.4 MG/ML
0.02 VIAL (ML) INJECTION
Status: DISCONTINUED | OUTPATIENT
Start: 2023-02-26 | End: 2023-02-27 | Stop reason: HOSPADM

## 2023-02-26 RX ORDER — SODIUM,POTASSIUM PHOSPHATES 280-250MG
2 POWDER IN PACKET (EA) ORAL
Status: DISCONTINUED | OUTPATIENT
Start: 2023-02-26 | End: 2023-02-27 | Stop reason: HOSPADM

## 2023-02-26 RX ORDER — ENOXAPARIN SODIUM 100 MG/ML
40 INJECTION SUBCUTANEOUS EVERY 24 HOURS
Status: DISCONTINUED | OUTPATIENT
Start: 2023-02-27 | End: 2023-02-27 | Stop reason: HOSPADM

## 2023-02-26 RX ORDER — ACETAMINOPHEN 500 MG
500 TABLET ORAL EVERY 6 HOURS PRN
Status: DISCONTINUED | OUTPATIENT
Start: 2023-02-26 | End: 2023-02-27 | Stop reason: HOSPADM

## 2023-02-26 RX ORDER — SODIUM CHLORIDE 0.9 % (FLUSH) 0.9 %
10 SYRINGE (ML) INJECTION EVERY 12 HOURS PRN
Status: DISCONTINUED | OUTPATIENT
Start: 2023-02-26 | End: 2023-02-27 | Stop reason: HOSPADM

## 2023-02-26 RX ORDER — ONDANSETRON 8 MG/1
8 TABLET, ORALLY DISINTEGRATING ORAL EVERY 8 HOURS PRN
Status: DISCONTINUED | OUTPATIENT
Start: 2023-02-26 | End: 2023-02-27 | Stop reason: HOSPADM

## 2023-02-26 RX ORDER — BISACODYL 10 MG
10 SUPPOSITORY, RECTAL RECTAL DAILY PRN
Status: DISCONTINUED | OUTPATIENT
Start: 2023-02-26 | End: 2023-02-27 | Stop reason: HOSPADM

## 2023-02-26 RX ORDER — MORPHINE SULFATE 2 MG/ML
2 INJECTION, SOLUTION INTRAMUSCULAR; INTRAVENOUS EVERY 4 HOURS PRN
Status: DISCONTINUED | OUTPATIENT
Start: 2023-02-26 | End: 2023-02-27 | Stop reason: HOSPADM

## 2023-02-26 RX ADMIN — METRONIDAZOLE 500 MG: 5 INJECTION, SOLUTION INTRAVENOUS at 12:02

## 2023-02-26 RX ADMIN — ONDANSETRON 4 MG: 2 INJECTION INTRAMUSCULAR; INTRAVENOUS at 12:02

## 2023-02-26 RX ADMIN — MORPHINE SULFATE 4 MG: 4 INJECTION INTRAVENOUS at 12:02

## 2023-02-26 RX ADMIN — ACETAMINOPHEN 500 MG: 500 TABLET ORAL at 10:02

## 2023-02-26 RX ADMIN — CIPROFLOXACIN 400 MG: 2 INJECTION, SOLUTION INTRAVENOUS at 12:02

## 2023-02-26 RX ADMIN — METRONIDAZOLE 500 MG: 5 INJECTION, SOLUTION INTRAVENOUS at 04:02

## 2023-02-26 RX ADMIN — MORPHINE SULFATE 4 MG: 4 INJECTION INTRAVENOUS at 04:02

## 2023-02-26 RX ADMIN — SODIUM CHLORIDE: 9 INJECTION, SOLUTION INTRAVENOUS at 04:02

## 2023-02-26 RX ADMIN — ONDANSETRON 4 MG: 2 INJECTION INTRAMUSCULAR; INTRAVENOUS at 07:02

## 2023-02-26 RX ADMIN — MORPHINE SULFATE 4 MG: 4 INJECTION INTRAVENOUS at 07:02

## 2023-02-26 RX ADMIN — SODIUM CHLORIDE 1000 ML: 9 INJECTION, SOLUTION INTRAVENOUS at 03:02

## 2023-02-26 RX ADMIN — HYDROCODONE BITARTRATE AND ACETAMINOPHEN 1 TABLET: 7.5; 325 TABLET ORAL at 08:02

## 2023-02-26 RX ADMIN — TOPIRAMATE 50 MG: 25 TABLET, FILM COATED ORAL at 08:02

## 2023-02-26 RX ADMIN — METRONIDAZOLE 500 MG: 5 INJECTION, SOLUTION INTRAVENOUS at 07:02

## 2023-02-26 NOTE — ASSESSMENT & PLAN NOTE
CT abdomen and pelvis shows findings suggestive of left colonic diverticulitis  Started on Cipro & Flagyl  Leukocytosis improving  Pain control including IV morphine p.r.n.   Nausea control with Zofran p.r.n.  Serial abdominal exam  Monitor CBC and fever curve

## 2023-02-26 NOTE — CARE UPDATE
[facility] ED Transfer of Care Note       Referring facility: [facility] ED   Referring provider: Dr. Perry Painter  Accepting facility: [facility]  Accepting provider: Beulah Olguin  Reason for transfer:  acute diverticulitis  Transfer diagnosis: acute diverticulitis   Transfer specialty requested: HM  Transfer specialty notified: yes  Transfer level: med/ surg  Bed type requested: Standard  Isolation status: No active isolations   Admission class or status: Obs      Narrative      Patient currently in [facility] ED, got most information from documentation.      Patient is a 38-year-old female who presents today with complaints of left flank pain.  Patient states she woke up with the symptoms.  Pain has progressively worsened throughout the day.  She started having nausea and vomiting.  She reports a T-max at home of 100.4.  Denies any abdominal pain, diarrhea, chest pain, shortness of breath, dysuria, hematuria.  Denies a history of nephrolithiasis.  She does have a history of gastric bypass;      CT A/P showed - Fat stranding and pericolonic fluid density adjacent to left colon with few colonic diverticuli. Findings suggestive of left colonic diverticulitis..      Currently on antibiotics;        Objective     Vitals: Temp: 98.9 °F (37.2 °C) (02/26/23 0040)  Pulse: 84 (02/26/23 1225)  Resp: 18 (02/26/23 1224)  BP: 134/76 (02/26/23 1225)  SpO2: 97 % (02/26/23 1225)  Recent Labs: All pertinent labs within the past 24 hours have been reviewed.  CBC:   Recent Labs   Lab 02/25/23 1948 02/26/23  0438   WBC 17.10* 13.06*   HGB 13.1 11.4*   HCT 39.5 34.0*    231     CMP:   Recent Labs   Lab 02/25/23 1948 02/26/23  0438    140   K 4.0 3.7    108   CO2 23 23    101   BUN 13 11   CREATININE 0.8 0.8   CALCIUM 9.5 8.6*   PROT 7.7  --    ALBUMIN 4.3  --    BILITOT 0.2  --    ALKPHOS 59  --    AST 15  --    ALT 19  --    ANIONGAP 13 9     Recent imaging:      Imaging Results               CT Abdomen Pelvis W Wo Contrast (Final result)  Result time 02/25/23 21:45:14      Final result by Darrius Carr MD (02/25/23 21:45:14)                   Impression:      Fat stranding and pericolonic fluid density adjacent to left colon with few colonic diverticuli. Findings suggestive of left colonic diverticulitis..    Small fat containing umbilical hernia with minimal fat stranding.  Correlate to Lynn umbilical pain.    All CT scans at this facility use dose modulation, iterative reconstruction, and/or weight based dosing when appropriate to reduce radiation dose to as low as reasonable achievable.      Electronically signed by: Darrius Carr  Date:    02/25/2023  Time:    21:45               Narrative:    EXAMINATION:  CT ABDOMEN PELVIS W WO CONTRAST    CLINICAL HISTORY:  Flank pain, kidney stone suspected;Nausea/vomiting;Sepsis;    TECHNIQUE:  Low dose axial images, sagittal and coronal reformations were obtained from the lung bases to the pubic symphysis following the IV administration of 100 mL of Omnipaque 350 a Non-contrast, arterial, portal venous, and delayed phases were acquired over the abdomen.    COMPARISON:  None    FINDINGS:  Heart: Normal in size. No pericardial effusion.    Lung Bases: Well aerated, without consolidation or pleural fluid.    Liver: Fatty infiltration of the liver with hepatomegaly., with no focal hepatic lesions.    Gallbladder: No calcified gallstones.    Bile Ducts: No evidence of dilated ducts.    Pancreas: No mass or peripancreatic fat stranding.    Spleen: Unremarkable.    Adrenals: Unremarkable.    Kidneys/ Ureters: Unremarkable.    Bladder: No evidence of wall thickening.    Reproductive organs: Unremarkable.    GI Tract/Mesentery: Fat stranding and pericolonic fluid density adjacent to left colon with few colonic diverticuli.  Findings suggestive of left colonic diverticulitis..  Normal appendix.  Status post gastric bypass.    Peritoneal Space: No ascites.  No free air.    Retroperitoneum: No significant adenopathy.    Abdominal wall: Mild fat containing umbilical hernia with minimal fat stranding.    Vasculature: No significant atherosclerosis or aneurysm.    Bones: No acute fracture.                                     Airway:     Vent settings:     IV access:        Peripheral IV - Single Lumen 02/25/23 1944 22 G Left Antecubital (Active)   Site Assessment Clean;Dry;Intact;No swelling;No redness 02/26/23 0526   Extremity Assessment Distal to IV No abnormal discoloration;No redness;No swelling;No warmth 02/25/23 1944   Line Status Blood return noted;Flushed;Saline locked 02/26/23 0526   Dressing Status Clean;Dry;Intact 02/26/23 0526   Dressing Intervention Integrity maintained 02/26/23 0526       Allergies: Review of patient's allergies indicates:  No Known Allergies   NPO: No    Anticoagulation:   Anticoagulants       None             Instructions      [facility]  Admit to Hospital Medicine  Upon patient arrival to floor, please contact Hospital Medicine on call.

## 2023-02-26 NOTE — HPI
Shruti Mercado is a 38 y.o. female with Migraines, Pseudotumor cerebri and history of sleeve gastrectomy who initially presented to [facility] ED on 2/25/2023 with left flank pain which started when she woke in the morning and continue to progress.  She also developed nausea and vomiting, and fever with T-max of 100.4° F at home.  Denies any dysuria, hematuria, diarrhea.  Reports that the she has been feeling weak the prior day, however did not any other symptoms.  Workup in ED significant for WBC 17,100, CT of abdomen and pelvis which showed findings suggestive of left colonic diverticulitis.  Started on Cipro and Flagyl.  Transfer to [facility] initiated.  Placed in observation for treatment of diverticulitis    Patient seen and examined in her room after arrival.  Reports increased pain which during transfer.  Relays that rather than left flank, pain is now located on the left lower quadrant, described pain as excruciating.  Pain is better with sitting up, worse when lying supine.  States that morphine helps control the pain, however only last a short amount of time.  Does not currently endorse any nausea or vomiting.  Has not had bowel since movement since prior to ED evaluation.    PCP: Angelina Medrano NP    SDM:  Spouse

## 2023-02-26 NOTE — PLAN OF CARE
POC reviewed with pt. Pt verbalizes understanding of POC. No questions at this time.  AAOx4. NADN.  Pt remains free of falls  No complaints at this time.  Safety measures in place.   Informed pt to call for assistance before getting up. Pt verbalizes understanding.

## 2023-02-26 NOTE — ED PROVIDER NOTES
Encounter Date: 2/25/2023       History     Chief Complaint   Patient presents with    Flank Pain     Left flank pain began this morning. Vomiting.      Patient is a 38-year-old female who presents today with complaints of left flank pain.  Patient states she woke up with the symptoms.  Pain has progressively worsened throughout the day.  She started having nausea and vomiting.  She reports a T-max at home of 100.4.  Denies any abdominal pain, diarrhea, chest pain, shortness of breath, dysuria, hematuria.  Denies a history of nephrolithiasis.  She does have a history of gastric bypass    Review of patient's allergies indicates:  No Known Allergies  Past Medical History:   Diagnosis Date    Migraines     Morbid obesity      Past Surgical History:   Procedure Laterality Date    ABSCESS DRAINAGE      ENDOMETRIAL ABLATION  02/22/2019    HIP SURGERY Right     LAPAROSCOPIC TUBAL LIGATION W/ ANTOINETTE CLIPS  2008    OVARIAN CYST SURGERY      sleeve      TUBAL LIGATION       Family History   Problem Relation Age of Onset    Hypertension Mother     Hypertension Father     Diabetes Father     Cancer Sister     No Known Problems Daughter     No Known Problems Son     No Known Problems Sister     No Known Problems Sister     No Known Problems Sister     Cancer Paternal Grandfather     Stroke Paternal Grandfather      Social History     Tobacco Use    Smoking status: Never    Smokeless tobacco: Never   Substance Use Topics    Alcohol use: No    Drug use: No     Review of Systems   Constitutional:  Positive for fever. Negative for chills and diaphoresis.   HENT:  Negative for congestion, rhinorrhea and sore throat.    Eyes:  Negative for pain, redness and visual disturbance.   Respiratory:  Negative for cough and shortness of breath.    Cardiovascular:  Negative for chest pain, palpitations and leg swelling.   Gastrointestinal:  Positive for nausea and vomiting. Negative for abdominal distention, abdominal pain, constipation and  diarrhea.   Genitourinary:  Positive for flank pain. Negative for dysuria, hematuria and vaginal bleeding.   Musculoskeletal:  Negative for arthralgias and joint swelling.   Skin:  Negative for rash and wound.   Neurological:  Negative for seizures, syncope and headaches.   All other systems reviewed and are negative.    Physical Exam     Initial Vitals [02/25/23 1850]   BP Pulse Resp Temp SpO2   (!) 182/99 (!) 124 18 99.7 °F (37.6 °C) 97 %      MAP       --         Physical Exam    Nursing note and vitals reviewed.  Constitutional: She appears well-developed and well-nourished. She appears distressed (Appears uncomfortable secondary to pain).   HENT:   Head: Normocephalic and atraumatic.   Mouth/Throat: Oropharynx is clear and moist.   Eyes: Conjunctivae and EOM are normal. Pupils are equal, round, and reactive to light.   Neck: Neck supple. No tracheal deviation present.   Cardiovascular:  Normal rate, regular rhythm, normal heart sounds and intact distal pulses.           Pulmonary/Chest: Breath sounds normal. No respiratory distress.   Abdominal: Abdomen is soft. She exhibits no distension. There is no abdominal tenderness. There is no rebound and no guarding.   Genitourinary:    Genitourinary Comments: No CVA tenderness     Musculoskeletal:         General: No tenderness or edema. Normal range of motion.      Cervical back: Neck supple.     Neurological: She is alert and oriented to person, place, and time. GCS score is 15. GCS eye subscore is 4. GCS verbal subscore is 5. GCS motor subscore is 6.   No focal deficits   Skin: Skin is warm. No rash noted. No erythema.   Psychiatric: She has a normal mood and affect. Her behavior is normal.       ED Course   Procedures  Labs Reviewed   CULTURE, BLOOD   CULTURE, BLOOD   HIV 1 / 2 ANTIBODY   HEPATITIS C ANTIBODY   HEP C VIRUS HOLD SPECIMEN   LACTIC ACID, PLASMA   CBC W/ AUTO DIFFERENTIAL   COMPREHENSIVE METABOLIC PANEL   LIPASE   URINALYSIS, REFLEX TO URINE CULTURE    PREGNANCY TEST, URINE RAPID     Results for orders placed or performed during the hospital encounter of 02/25/23   HCV Virus Hold Specimen   Result Value Ref Range    HEP C Virus Hold Specimen Hold for HCV sendout    Lactic acid, plasma   Result Value Ref Range    Lactate (Lactic Acid) 2.1 0.5 - 2.2 mmol/L   CBC auto differential   Result Value Ref Range    WBC 17.10 (H) 3.90 - 12.70 K/uL    RBC 4.30 4.00 - 5.40 M/uL    Hemoglobin 13.1 12.0 - 16.0 g/dL    Hematocrit 39.5 37.0 - 48.5 %    MCV 92 82 - 98 fL    MCH 30.5 27.0 - 31.0 pg    MCHC 33.2 32.0 - 36.0 g/dL    RDW 11.9 11.5 - 14.5 %    Platelets 288 150 - 450 K/uL    MPV 10.2 9.2 - 12.9 fL    Immature Granulocytes 0.5 0.0 - 0.5 %    Gran # (ANC) 14.0 (H) 1.8 - 7.7 K/uL    Immature Grans (Abs) 0.09 (H) 0.00 - 0.04 K/uL    Lymph # 1.6 1.0 - 4.8 K/uL    Mono # 1.3 (H) 0.3 - 1.0 K/uL    Eos # 0.1 0.0 - 0.5 K/uL    Baso # 0.05 0.00 - 0.20 K/uL    nRBC 0 0 /100 WBC    Gran % 81.7 (H) 38.0 - 73.0 %    Lymph % 9.1 (L) 18.0 - 48.0 %    Mono % 7.8 4.0 - 15.0 %    Eosinophil % 0.6 0.0 - 8.0 %    Basophil % 0.3 0.0 - 1.9 %    Differential Method Automated    Comprehensive metabolic panel   Result Value Ref Range    Sodium 143 136 - 145 mmol/L    Potassium 4.0 3.5 - 5.1 mmol/L    Chloride 107 95 - 110 mmol/L    CO2 23 23 - 29 mmol/L    Glucose 104 70 - 110 mg/dL    BUN 13 6 - 20 mg/dL    Creatinine 0.8 0.5 - 1.4 mg/dL    Calcium 9.5 8.7 - 10.5 mg/dL    Total Protein 7.7 6.0 - 8.4 g/dL    Albumin 4.3 3.5 - 5.2 g/dL    Total Bilirubin 0.2 0.1 - 1.0 mg/dL    Alkaline Phosphatase 59 55 - 135 U/L    AST 15 10 - 40 U/L    ALT 19 10 - 44 U/L    Anion Gap 13 8 - 16 mmol/L    eGFR >60.0 >60 mL/min/1.73 m^2   Lipase   Result Value Ref Range    Lipase 30 4 - 60 U/L   Urinalysis, Reflex to Urine Culture Urine, Clean Catch    Specimen: Urine   Result Value Ref Range    Specimen UA Urine, Clean Catch     Color, UA Yellow Yellow, Straw, Alannah    Appearance, UA Clear Clear    pH, UA 6.0  5.0 - 8.0    Specific Gravity, UA 1.020 1.005 - 1.030    Protein, UA Negative Negative    Glucose, UA Negative Negative    Ketones, UA Negative Negative    Bilirubin (UA) Negative Negative    Occult Blood UA Negative Negative    Nitrite, UA Negative Negative    Urobilinogen, UA Negative <2.0 EU/dL    Leukocytes, UA Negative Negative   Pregnancy, urine rapid   Result Value Ref Range    Preg Test, Ur Negative             Imaging Results    None          Medications   sodium chloride 0.9% bolus 1,000 mL 1,000 mL (has no administration in time range)   morphine injection 4 mg (has no administration in time range)   ondansetron injection 4 mg (has no administration in time range)     Medical Decision Making:   Initial Assessment:   Left flank pain with nausea/vomiting and fever  Differential Diagnosis:   Kidney stone, pyelonephritis, diverticulitis  Clinical Tests:   Lab Tests: Ordered and Reviewed  The following lab test(s) were unremarkable: CBC, CMP, Urinalysis and Lipase       <> Summary of Lab: Leukocytosis.  No urinary tract infection  Radiological Study: Ordered and Reviewed  Other:   I have discussed this case with another health care provider.       <> Summary of the Discussion: Discussed case with Hospital Medicine.  Obs Dr. Rene  Diverticulitis in a 38-year-old female with fever, leukocytosis, and vomiting.  I believe patient would benefit from IV antibiotics at this time given the severity of her symptoms and inability to tolerate p.o..  Patient will need admission for IV antibiotics.  Patient will need transfer since we do not have inpatient beds at this facility.  Patient voiced understanding of the plan of care including the need for transfer.  Dr. Rene is the accepting physician at [facility].  Patient will be transferred via [facility] with IV fluids and IV antibiotics EN route                        Clinical Impression:       Final diagnoses:  [K57.92] Diverticulitis  (Primary)  [R11.2] Nausea and vomiting, unspecified vomiting type    Observation          Perry Painter MD  02/26/23 0234

## 2023-02-26 NOTE — SUBJECTIVE & OBJECTIVE
Past Medical History:   Diagnosis Date    Migraines     Morbid obesity     Pseudotumor cerebri        Past Surgical History:   Procedure Laterality Date    ABSCESS DRAINAGE      ENDOMETRIAL ABLATION  02/22/2019    HIP SURGERY Right     LAPAROSCOPIC TUBAL LIGATION W/ ANTOINETTE CLIPS  2008    OVARIAN CYST SURGERY      sleeve      TUBAL LIGATION         Review of patient's allergies indicates:  No Known Allergies    No current facility-administered medications on file prior to encounter.     Current Outpatient Medications on File Prior to Encounter   Medication Sig    methazoLAMIDE (NEPTAZANE) 50 MG Tab Take 1 tablet (50 mg total) by mouth 2 (two) times daily.    rimegepant 75 mg odt Take 1 tablet (75 mg total) by mouth as needed for Migraine (max dose 3 doses within 1 week). Place ODT tablet on the tongue; alternatively the ODT tablet may be placed under the tongue    topiramate (TOPAMAX) 50 MG tablet Take 1 tablet (50 mg total) by mouth 2 (two) times daily. Take 1/2 tablet twice a day for 1 week then full tablet twice a day thereafter.    multivitamin capsule Take 1 capsule by mouth once daily.     Family History       Problem Relation (Age of Onset)    Cancer Sister, Paternal Grandfather    Diabetes Father    Hypertension Mother, Father    No Known Problems Daughter, Son, Sister, Sister, Sister    Stroke Paternal Grandfather          Tobacco Use    Smoking status: Never    Smokeless tobacco: Never   Substance and Sexual Activity    Alcohol use: No    Drug use: No    Sexual activity: Yes     Review of Systems   Constitutional:  Positive for fever. Negative for chills.   Respiratory:  Negative for cough, shortness of breath and wheezing.    Cardiovascular:  Negative for chest pain and palpitations.   Gastrointestinal:  Positive for abdominal pain, nausea and vomiting. Negative for constipation and diarrhea.   Neurological:  Negative for dizziness and headaches.   All other systems reviewed and are negative.  Objective:      Vital Signs (Most Recent):  Temp: 99.3 °F (37.4 °C) (02/26/23 1620)  Pulse: 95 (02/26/23 1620)  Resp: 18 (02/26/23 1629)  BP: (!) 161/82 (02/26/23 1620)  SpO2: 98 % (02/26/23 1620)   Vital Signs (24h Range):  Temp:  [98.9 °F (37.2 °C)-101.3 °F (38.5 °C)] 99.3 °F (37.4 °C)  Pulse:  [] 95  Resp:  [18-20] 18  SpO2:  [95 %-99 %] 98 %  BP: (107-182)/(60-99) 161/82     Weight: 105.7 kg (232 lb 14.7 oz)  Body mass index is 39.36 kg/m².    Physical Exam  Vitals and nursing note reviewed.   HENT:      Head: Normocephalic and atraumatic.      Right Ear: External ear normal.      Left Ear: External ear normal.   Eyes:      Pupils: Pupils are equal, round, and reactive to light.   Cardiovascular:      Rate and Rhythm: Normal rate and regular rhythm.   Pulmonary:      Effort: Pulmonary effort is normal. No accessory muscle usage or respiratory distress.      Breath sounds: No wheezing.   Abdominal:      General: There is no distension.      Palpations: Abdomen is soft.      Tenderness: There is abdominal tenderness in the left upper quadrant and left lower quadrant. There is no guarding or rebound.   Musculoskeletal:      Cervical back: Neck supple.      Right lower leg: No edema.      Left lower leg: No edema.   Skin:     General: Skin is warm and dry.   Neurological:      Mental Status: She is alert and oriented to person, place, and time. Mental status is at baseline.   Psychiatric:         Mood and Affect: Mood normal.         Behavior: Behavior is cooperative.         CRANIAL NERVES     CN III, IV, VI   Pupils are equal, round, and reactive to light.     Significant Labs: All pertinent labs within the past 24 hours have been reviewed.  CBC:   Recent Labs   Lab 02/25/23 1948 02/26/23  0438   WBC 17.10* 13.06*   HGB 13.1 11.4*   HCT 39.5 34.0*    231     CMP:   Recent Labs   Lab 02/25/23 1948 02/26/23  0438    140   K 4.0 3.7    108   CO2 23 23    101   BUN 13 11   CREATININE 0.8 0.8    CALCIUM 9.5 8.6*   PROT 7.7  --    ALBUMIN 4.3  --    BILITOT 0.2  --    ALKPHOS 59  --    AST 15  --    ALT 19  --    ANIONGAP 13 9       Significant Imaging: I have reviewed all pertinent imaging results/findings within the past 24 hours.

## 2023-02-27 VITALS
SYSTOLIC BLOOD PRESSURE: 132 MMHG | TEMPERATURE: 99 F | BODY MASS INDEX: 38.45 KG/M2 | WEIGHT: 230.81 LBS | HEART RATE: 84 BPM | OXYGEN SATURATION: 96 % | HEIGHT: 65 IN | DIASTOLIC BLOOD PRESSURE: 66 MMHG | RESPIRATION RATE: 17 BRPM

## 2023-02-27 LAB
ALBUMIN SERPL BCP-MCNC: 3.4 G/DL (ref 3.5–5.2)
ALP SERPL-CCNC: 49 U/L (ref 55–135)
ALT SERPL W/O P-5'-P-CCNC: 11 U/L (ref 10–44)
ANION GAP SERPL CALC-SCNC: 14 MMOL/L (ref 8–16)
AST SERPL-CCNC: 10 U/L (ref 10–40)
BASOPHILS # BLD AUTO: 0.01 K/UL (ref 0–0.2)
BASOPHILS NFR BLD: 0.1 % (ref 0–1.9)
BILIRUB SERPL-MCNC: 0.6 MG/DL (ref 0.1–1)
BUN SERPL-MCNC: 9 MG/DL (ref 6–20)
CALCIUM SERPL-MCNC: 8.8 MG/DL (ref 8.7–10.5)
CHLORIDE SERPL-SCNC: 107 MMOL/L (ref 95–110)
CO2 SERPL-SCNC: 19 MMOL/L (ref 23–29)
CREAT SERPL-MCNC: 0.8 MG/DL (ref 0.5–1.4)
DIFFERENTIAL METHOD: ABNORMAL
EOSINOPHIL # BLD AUTO: 0.1 K/UL (ref 0–0.5)
EOSINOPHIL NFR BLD: 1.5 % (ref 0–8)
ERYTHROCYTE [DISTWIDTH] IN BLOOD BY AUTOMATED COUNT: 11.9 % (ref 11.5–14.5)
EST. GFR  (NO RACE VARIABLE): >60 ML/MIN/1.73 M^2
GLUCOSE SERPL-MCNC: 83 MG/DL (ref 70–110)
HCT VFR BLD AUTO: 35.1 % (ref 37–48.5)
HGB BLD-MCNC: 11.6 G/DL (ref 12–16)
IMM GRANULOCYTES # BLD AUTO: 0.04 K/UL (ref 0–0.04)
IMM GRANULOCYTES NFR BLD AUTO: 0.5 % (ref 0–0.5)
LYMPHOCYTES # BLD AUTO: 1.3 K/UL (ref 1–4.8)
LYMPHOCYTES NFR BLD: 15.2 % (ref 18–48)
MCH RBC QN AUTO: 30.3 PG (ref 27–31)
MCHC RBC AUTO-ENTMCNC: 33 G/DL (ref 32–36)
MCV RBC AUTO: 92 FL (ref 82–98)
MONOCYTES # BLD AUTO: 0.8 K/UL (ref 0.3–1)
MONOCYTES NFR BLD: 8.9 % (ref 4–15)
NEUTROPHILS # BLD AUTO: 6.4 K/UL (ref 1.8–7.7)
NEUTROPHILS NFR BLD: 73.8 % (ref 38–73)
NRBC BLD-RTO: 0 /100 WBC
PLATELET # BLD AUTO: 243 K/UL (ref 150–450)
PMV BLD AUTO: 10.6 FL (ref 9.2–12.9)
POTASSIUM SERPL-SCNC: 3.5 MMOL/L (ref 3.5–5.1)
PROT SERPL-MCNC: 6.7 G/DL (ref 6–8.4)
RBC # BLD AUTO: 3.83 M/UL (ref 4–5.4)
SODIUM SERPL-SCNC: 140 MMOL/L (ref 136–145)
WBC # BLD AUTO: 8.62 K/UL (ref 3.9–12.7)

## 2023-02-27 PROCEDURE — 25000003 PHARM REV CODE 250: Performed by: NURSE PRACTITIONER

## 2023-02-27 PROCEDURE — 80053 COMPREHEN METABOLIC PANEL: CPT | Performed by: INTERNAL MEDICINE

## 2023-02-27 PROCEDURE — 36415 COLL VENOUS BLD VENIPUNCTURE: CPT | Performed by: INTERNAL MEDICINE

## 2023-02-27 PROCEDURE — 63600175 PHARM REV CODE 636 W HCPCS: Performed by: NURSE PRACTITIONER

## 2023-02-27 PROCEDURE — 25000003 PHARM REV CODE 250: Performed by: INTERNAL MEDICINE

## 2023-02-27 PROCEDURE — S0030 INJECTION, METRONIDAZOLE: HCPCS | Performed by: NURSE PRACTITIONER

## 2023-02-27 PROCEDURE — 96366 THER/PROPH/DIAG IV INF ADDON: CPT

## 2023-02-27 PROCEDURE — 85025 COMPLETE CBC W/AUTO DIFF WBC: CPT | Performed by: INTERNAL MEDICINE

## 2023-02-27 PROCEDURE — G0378 HOSPITAL OBSERVATION PER HR: HCPCS

## 2023-02-27 PROCEDURE — 96361 HYDRATE IV INFUSION ADD-ON: CPT

## 2023-02-27 RX ORDER — CIPROFLOXACIN 500 MG/1
500 TABLET ORAL EVERY 12 HOURS
Qty: 6 TABLET | Refills: 0 | Status: SHIPPED | OUTPATIENT
Start: 2023-02-27 | End: 2023-03-02

## 2023-02-27 RX ORDER — HYDROCODONE BITARTRATE AND ACETAMINOPHEN 5; 325 MG/1; MG/1
1 TABLET ORAL EVERY 6 HOURS PRN
Qty: 5 TABLET | Refills: 0 | Status: SHIPPED | OUTPATIENT
Start: 2023-02-27 | End: 2023-05-24

## 2023-02-27 RX ORDER — METRONIDAZOLE 500 MG/1
500 TABLET ORAL EVERY 8 HOURS
Qty: 9 TABLET | Refills: 0 | Status: SHIPPED | OUTPATIENT
Start: 2023-02-27 | End: 2023-03-02

## 2023-02-27 RX ADMIN — SODIUM CHLORIDE: 9 INJECTION, SOLUTION INTRAVENOUS at 11:02

## 2023-02-27 RX ADMIN — SODIUM CHLORIDE: 9 INJECTION, SOLUTION INTRAVENOUS at 08:02

## 2023-02-27 RX ADMIN — METRONIDAZOLE 500 MG: 5 INJECTION, SOLUTION INTRAVENOUS at 12:02

## 2023-02-27 RX ADMIN — CIPROFLOXACIN 400 MG: 2 INJECTION, SOLUTION INTRAVENOUS at 01:02

## 2023-02-27 RX ADMIN — TOPIRAMATE 50 MG: 25 TABLET, FILM COATED ORAL at 08:02

## 2023-02-27 RX ADMIN — POLYETHYLENE GLYCOL 3350 17 G: 17 POWDER, FOR SOLUTION ORAL at 08:02

## 2023-02-27 RX ADMIN — CIPROFLOXACIN 400 MG: 2 INJECTION, SOLUTION INTRAVENOUS at 11:02

## 2023-02-27 RX ADMIN — HYDROCODONE BITARTRATE AND ACETAMINOPHEN 1 TABLET: 7.5; 325 TABLET ORAL at 08:02

## 2023-02-27 RX ADMIN — METRONIDAZOLE 500 MG: 5 INJECTION, SOLUTION INTRAVENOUS at 08:02

## 2023-02-27 NOTE — PLAN OF CARE
[facility] - Telemetry (Hospital)  Initial Discharge Assessment    Primary Care Provider: Angelina Medrano NP    Admission Diagnosis: Diverticulitis [K57.92]  Nausea and vomiting, unspecified vomiting type [R11.2]    Admission Date: 2/25/2023  Expected Discharge Date:     Discharge Barriers Identified: None    Payor: [employer] / Plan: [employer] HMO OPEN ACCESS / Product Type: HMO /     Extended Emergency Contact Information  Primary Emergency Contact: Nicki Mercado  Address: [address] [place] [address] [place]  Mobile Phone: [phone]  Relation: Spouse    Discharge Plan A: Home, Home with family  Discharge Plan B: Home, Home with family      [facility] [place] - [address]  [address] [address]  Phone: [phone] Fax: [phone]      Initial Assessment (most recent)       Adult Discharge Assessment - 02/27/23 1013          Discharge Assessment    Assessment Type Discharge Planning Assessment     Confirmed/corrected address, phone number and insurance Yes     Confirmed Demographics Correct on Facesheet     Source of Information patient     Communicated SONIA with patient/caregiver Date not available/Unable to determine     People in Home spouse     Facility Arrived From: Home     Do you expect to return to your current living situation? Yes     Do you have help at home or someone to help you manage your care at home? Yes     Who are your caregiver(s) and their phone number(s)? Pt's spouse     Prior to hospitilization cognitive status: Alert/Oriented     Current cognitive status: Alert/Oriented     Equipment Currently Used at Home none     Readmission within 30 days? No     Patient currently being followed by outpatient case management? No     Do you currently have service(s) that help you manage your care at home? No     Do you take prescription medications? Yes     Do you have prescription coverage? Yes     Do you have any problems  affording any of your prescribed medications? No     Is the patient taking medications as prescribed? yes     Who is going to help you get home at discharge? Pt's spouse     How do you get to doctors appointments? car, drives self;family or friend will provide     Are you on dialysis? No     Do you take coumadin? No     Discharge Plan A Home;Home with family     Discharge Plan B Home;Home with family     DME Needed Upon Discharge  none     Discharge Plan discussed with: Patient     Discharge Barriers Identified None                   Anticipated DC dispo: Home  Prior Level of Function: Lives at home with spouse, independent with ADLs and ambulatory   PCP: Angelina Lorenzo NP ([facility])     Comments: SW completed assessment with patient. SW role discussed. No CM needs expressed or identified during assessment. SW to remain available.

## 2023-02-27 NOTE — ASSESSMENT & PLAN NOTE
Continue topiramate for migraine prophylaxis  Continue Nurtec as needed, patient to bring her home supply as it is non formulary

## 2023-02-27 NOTE — ASSESSMENT & PLAN NOTE
Patient with history of pseudotumor cerebri, follows with Dr. Harry for Neurology  Continue home methazolamide  Given medication is non formulary, have asked family to bring her home supply

## 2023-02-27 NOTE — ASSESSMENT & PLAN NOTE
Body mass index is 39.01 kg/m². Obesity complicates all aspects of disease management from diagnostic modalities to treatment. Weight loss encouraged and health benefits explained to patient.

## 2023-02-27 NOTE — ASSESSMENT & PLAN NOTE
CT abdomen and pelvis shows findings suggestive of left colonic diverticulitis  Started on Cipro & Flagyl  Leukocytosis improving  Pain control including IV morphine p.r.n.   Nausea control with Zofran p.r.n.  Serial abdominal exam  Monitor CBC and fever curve    2/27/23  NAEON. Pain improved, tolerating FLD, advance to regular diet today  Patient reports flatus, no BM  Otherwise stable, ambulating in room w/o issues  Currently on cipro and flagyl, plan for PO course on discharge  Educated on increasing fiber intake, avoiding certain foods  Advised to f/u with PCP, will need colonoscopy at later date, defer GI referral to primary

## 2023-02-27 NOTE — DISCHARGE SUMMARY
[facility] ([facility])  [facility] Medicine  Discharge Summary      Patient Name: Shruti Mercado  MRN: [id number]  [facility]: [id number]  Patient Class: OP- Observation  Admission Date: 2/25/2023  Hospital Length of Stay: 0 days  Discharge Date and Time:  02/27/2023 12:31 PM  Attending Physician: Lexa Vega MD   Discharging Provider: Lexa Vega MD  Primary Care Provider: Angelina Medrano NP    Primary Care Team: [facility] MEDICINE C    HPI:   Shruti Mercado is a 38 y.o. female with Migraines, Pseudotumor cerebri and history of sleeve gastrectomy who initially presented to [facility] ED on 2/25/2023 with left flank pain which started when she woke in the morning and continue to progress.  She also developed nausea and vomiting, and fever with T-max of 100.4° F at home.  Denies any dysuria, hematuria, diarrhea.  Reports that the she has been feeling weak the prior day, however did not any other symptoms.  Workup in ED significant for WBC 17,100, CT of abdomen and pelvis which showed findings suggestive of left colonic diverticulitis.  Started on Cipro and Flagyl.  Transfer to [facility] initiated.  Placed in observation for treatment of diverticulitis    Patient seen and examined in her room after arrival.  Reports increased pain which during transfer.  Relays that rather than left flank, pain is now located on the left lower quadrant, described pain as excruciating.  Pain is better with sitting up, worse when lying supine.  States that morphine helps control the pain, however only last a short amount of time.  Does not currently endorse any nausea or vomiting.  Has not had bowel since movement since prior to ED evaluation.    PCP: Angelina Medrano NP    SDM:  Spouse          * No surgery found *      Hospital Course:   No notes on file     Goals of Care Treatment Preferences:  Code Status: Full Code      Consults:     Neuro  Migraine without aura and without status migrainosus, not intractable  Continue  topiramate for migraine prophylaxis  Continue Nurtec as needed, patient to bring her home supply as it is non formulary    Pseudotumor cerebri  Patient with history of pseudotumor cerebri, follows with Dr. Harry for Neurology  Continue home methazolamide  Given medication is non formulary, have asked family to bring her home supply    Endocrine  Obesity (BMI 35.0-39.9 without comorbidity)  Body mass index is 39.01 kg/m². Obesity complicates all aspects of disease management from diagnostic modalities to treatment. Weight loss encouraged and health benefits explained to patient.    GI  * Diverticulitis  CT abdomen and pelvis shows findings suggestive of left colonic diverticulitis  Started on Cipro & Flagyl  Leukocytosis improving  Pain control including IV morphine p.r.n.   Nausea control with Zofran p.r.n.  Serial abdominal exam  Monitor CBC and fever curve    2/27/23  NAEON. Pain improved, tolerating FLD, advance to regular diet today  Patient reports flatus, no BM  Otherwise stable, ambulating in room w/o issues  Currently on cipro and flagyl, plan for PO course on discharge  Educated on increasing fiber intake, avoiding certain foods  Advised to f/u with PCP, will need colonoscopy at later date, defer GI referral to primary      Final Active Diagnoses:    Diagnosis Date Noted POA    PRINCIPAL PROBLEM:  Diverticulitis [K57.92] 02/26/2023 Yes    Pseudotumor cerebri [G93.2] 08/02/2018 Yes    Migraine without aura and without status migrainosus, not intractable [G43.009] 09/19/2018 Yes    Obesity (BMI 35.0-39.9 without comorbidity) [E66.9] 07/02/2019 Yes      Problems Resolved During this Admission:       Discharged Condition: stable    Disposition: Home or Self Care    Follow Up:   Follow-up Information     Angelina Medrano NP. Schedule an appointment as soon as possible for a visit in 1 week(s).    Specialty: Family Medicine  Why: Hospital discharge follow up  Will need GI referral for outpatient  colonoscopy  Contact information:  [address] [address]  [place] [address] [phone]                       Patient Instructions:   No discharge procedures on file.    Significant Diagnostic Studies: Labs:   CMP   Recent Labs   Lab 02/25/23 1948 02/26/23 0438 02/27/23  0703    140 140   K 4.0 3.7 3.5    108 107   CO2 23 23 19*    101 83   BUN 13 11 9   CREATININE 0.8 0.8 0.8   CALCIUM 9.5 8.6* 8.8   PROT 7.7  --  6.7   ALBUMIN 4.3  --  3.4*   BILITOT 0.2  --  0.6   ALKPHOS 59  --  49*   AST 15  --  10   ALT 19  --  11   ANIONGAP 13 9 14    and CBC   Recent Labs   Lab 02/25/23 1948 02/26/23 0438 02/27/23  0703   WBC 17.10* 13.06* 8.62   HGB 13.1 11.4* 11.6*   HCT 39.5 34.0* 35.1*    231 243       Pending Diagnostic Studies:     None         Medications:  Reconciled Home Medications:      Medication List      START taking these medications    ciprofloxacin HCl 500 MG tablet  Commonly known as: CIPRO  Take 1 tablet (500 mg total) by mouth every 12 (twelve) hours. for 3 days     HYDROcodone-acetaminophen 5-325 mg per tablet  Commonly known as: NORCO  Take 1 tablet by mouth every 6 (six) hours as needed for Pain.     metroNIDAZOLE 500 MG tablet  Commonly known as: FLAGYL  Take 1 tablet (500 mg total) by mouth every 8 (eight) hours. for 3 days        CONTINUE taking these medications    methazoLAMIDE 50 MG Tab  Commonly known as: NEPTAZANE  Take 1 tablet (50 mg total) by mouth 2 (two) times daily.     multivitamin capsule  Take 1 capsule by mouth once daily.     rimegepant 75 mg odt  Take 1 tablet (75 mg total) by mouth as needed for Migraine (max dose 3 doses within 1 week). Place ODT tablet on the tongue; alternatively the ODT tablet may be placed under the tongue     topiramate 50 MG tablet  Commonly known as: TOPAMAX  Take 1 tablet (50 mg total) by mouth 2 (two) times daily. Take 1/2 tablet twice a day for 1 week then full tablet twice a day thereafter.            Indwelling Lines/Drains  at time of discharge:   Lines/Drains/Airways     None                 Time spent on the discharge of patient: < 30 minutes         Lexa Vega MD  Department of Hospital Medicine  [facility] ([facility])

## 2023-02-27 NOTE — PLAN OF CARE
[facility] (Hospital)  Discharge Final Note    Primary Care Provider: Angelina Medrano NP    Expected Discharge Date: 2/27/2023    Final Discharge Note (most recent)       Final Note - 02/27/23 1259          Final Note    Assessment Type Final Discharge Note     Anticipated Discharge Disposition Home or Self Care     Hospital Resources/Appts/Education Provided Appointments scheduled and added to AVS        Post-Acute Status    Discharge Delays None known at this time                   Pt to discharge home today.   PCP scheduled: Hospital Follow Up with Angelina Medrano NP  Tuesday Mar 7, 2023 10:00 AM    No CM needs for discharge.     Important Message from Medicare             Contact Info       Angelina Medrano NP   Specialty: Family Medicine   Relationship: PCP - General    [address] [place] [id number]   Phone: [phone]       Next Steps: Schedule an appointment as soon as possible for a visit in 1 week(s)    Instructions: Hospital discharge follow up  Will need GI referral for outpatient colonoscopy

## 2023-02-27 NOTE — ASSESSMENT & PLAN NOTE
Body mass index is 39.36 kg/m². Obesity complicates all aspects of disease management from diagnostic modalities to treatment. Weight loss encouraged and health benefits explained to patient.

## 2023-02-27 NOTE — H&P
[facility] Medicine  History & Physical    Patient Name: Shruti Mercado  MRN: [id number]  Patient Class: OP- Observation  Admission Date: 2/25/2023  Attending Physician: Beth Ford DO   Primary Care Provider: Angelina Medrano NP         Patient information was obtained from patient, past medical records and ER records.     Subjective:     Principal Problem:Diverticulitis    Chief Complaint:   Chief Complaint   Patient presents with    Flank Pain     Left flank pain began this morning. Vomiting.         HPI: Shruti Mercado is a 38 y.o. female with Migraines, Pseudotumor cerebri and history of sleeve gastrectomy who initially presented to [facility] ED on 2/25/2023 with left flank pain which started when she woke in the morning and continue to progress.  She also developed nausea and vomiting, and fever with T-max of 100.4° F at home.  Denies any dysuria, hematuria, diarrhea.  Reports that the she has been feeling weak the prior day, however did not any other symptoms.  Workup in ED significant for WBC 17,100, CT of abdomen and pelvis which showed findings suggestive of left colonic diverticulitis.  Started on Cipro and Flagyl.  Transfer to [facility] initiated.  Placed in observation for treatment of diverticulitis    Patient seen and examined in her room after arrival.  Reports increased pain which during transfer.  Relays that rather than left flank, pain is now located on the left lower quadrant, described pain as excruciating.  Pain is better with sitting up, worse when lying supine.  States that morphine helps control the pain, however only last a short amount of time.  Does not currently endorse any nausea or vomiting.  Has not had bowel since movement since prior to ED evaluation.    PCP: Angelina Medrano NP    SDM:  Spouse          Past Medical History:   Diagnosis Date    Migraines     Morbid obesity     Pseudotumor cerebri        Past Surgical History:   Procedure  Laterality Date    ABSCESS DRAINAGE      ENDOMETRIAL ABLATION  02/22/2019    HIP SURGERY Right     LAPAROSCOPIC TUBAL LIGATION W/ ANTOINETTE CLIPS  2008    OVARIAN CYST SURGERY      sleeve      TUBAL LIGATION         Review of patient's allergies indicates:  No Known Allergies    No current facility-administered medications on file prior to encounter.     Current Outpatient Medications on File Prior to Encounter   Medication Sig    methazoLAMIDE (NEPTAZANE) 50 MG Tab Take 1 tablet (50 mg total) by mouth 2 (two) times daily.    rimegepant 75 mg odt Take 1 tablet (75 mg total) by mouth as needed for Migraine (max dose 3 doses within 1 week). Place ODT tablet on the tongue; alternatively the ODT tablet may be placed under the tongue    topiramate (TOPAMAX) 50 MG tablet Take 1 tablet (50 mg total) by mouth 2 (two) times daily. Take 1/2 tablet twice a day for 1 week then full tablet twice a day thereafter.    multivitamin capsule Take 1 capsule by mouth once daily.     Family History       Problem Relation (Age of Onset)    Cancer Sister, Paternal Grandfather    Diabetes Father    Hypertension Mother, Father    No Known Problems Daughter, Son, Sister, Sister, Sister    Stroke Paternal Grandfather          Tobacco Use    Smoking status: Never    Smokeless tobacco: Never   Substance and Sexual Activity    Alcohol use: No    Drug use: No    Sexual activity: Yes     Review of Systems   Constitutional:  Positive for fever. Negative for chills.   Respiratory:  Negative for cough, shortness of breath and wheezing.    Cardiovascular:  Negative for chest pain and palpitations.   Gastrointestinal:  Positive for abdominal pain, nausea and vomiting. Negative for constipation and diarrhea.   Neurological:  Negative for dizziness and headaches.   All other systems reviewed and are negative.  Objective:     Vital Signs (Most Recent):  Temp: 99.3 °F (37.4 °C) (02/26/23 1620)  Pulse: 95 (02/26/23 1620)  Resp: 18 (02/26/23  1629)  BP: (!) 161/82 (02/26/23 1620)  SpO2: 98 % (02/26/23 1620)   Vital Signs (24h Range):  Temp:  [98.9 °F (37.2 °C)-101.3 °F (38.5 °C)] 99.3 °F (37.4 °C)  Pulse:  [] 95  Resp:  [18-20] 18  SpO2:  [95 %-99 %] 98 %  BP: (107-182)/(60-99) 161/82     Weight: 105.7 kg (232 lb 14.7 oz)  Body mass index is 39.36 kg/m².    Physical Exam  Vitals and nursing note reviewed.   HENT:      Head: Normocephalic and atraumatic.      Right Ear: External ear normal.      Left Ear: External ear normal.   Eyes:      Pupils: Pupils are equal, round, and reactive to light.   Cardiovascular:      Rate and Rhythm: Normal rate and regular rhythm.   Pulmonary:      Effort: Pulmonary effort is normal. No accessory muscle usage or respiratory distress.      Breath sounds: No wheezing.   Abdominal:      General: There is no distension.      Palpations: Abdomen is soft.      Tenderness: There is abdominal tenderness in the left upper quadrant and left lower quadrant. There is no guarding or rebound.   Musculoskeletal:      Cervical back: Neck supple.      Right lower leg: No edema.      Left lower leg: No edema.   Skin:     General: Skin is warm and dry.   Neurological:      Mental Status: She is alert and oriented to person, place, and time. Mental status is at baseline.   Psychiatric:         Mood and Affect: Mood normal.         Behavior: Behavior is cooperative.         CRANIAL NERVES     CN III, IV, VI   Pupils are equal, round, and reactive to light.     Significant Labs: All pertinent labs within the past 24 hours have been reviewed.  CBC:   Recent Labs   Lab 02/25/23 1948 02/26/23 0438   WBC 17.10* 13.06*   HGB 13.1 11.4*   HCT 39.5 34.0*    231     CMP:   Recent Labs   Lab 02/25/23 1948 02/26/23 0438    140   K 4.0 3.7    108   CO2 23 23    101   BUN 13 11   CREATININE 0.8 0.8   CALCIUM 9.5 8.6*   PROT 7.7  --    ALBUMIN 4.3  --    BILITOT 0.2  --    ALKPHOS 59  --    AST 15  --    ALT 19  --     ANIONGAP 13 9       Significant Imaging: I have reviewed all pertinent imaging results/findings within the past 24 hours.    Assessment/Plan:     * Diverticulitis  CT abdomen and pelvis shows findings suggestive of left colonic diverticulitis  Started on Cipro & Flagyl  Leukocytosis improving  Pain control including IV morphine p.r.n.   Nausea control with Zofran p.r.n.  Serial abdominal exam  Monitor CBC and fever curve    Obesity (BMI 35.0-39.9 without comorbidity)  Body mass index is 39.36 kg/m². Obesity complicates all aspects of disease management from diagnostic modalities to treatment. Weight loss encouraged and health benefits explained to patient.         Migraine without aura and without status migrainosus, not intractable  Continue topiramate for migraine prophylaxis  Continue Nurtec as needed, patient to bring her home supply as it is non formulary    Pseudotumor cerebri  Patient with history of pseudotumor cerebri, follows with Dr. Harry for Neurology  Continue home methazolamide  Given medication is non formulary, have asked family to bring her home supply        VTE Risk Mitigation (From admission, onward)         Ordered     enoxaparin injection 40 mg  Daily         02/26/23 1736     IP VTE HIGH RISK PATIENT  Once         02/26/23 1736     Place sequential compression device  Until discontinued         02/26/23 1736                   Beth Ford DO  Department of Hospital Medicine   [facility] ([facility])

## 2023-03-03 LAB
BACTERIA BLD CULT: NORMAL
BACTERIA BLD CULT: NORMAL

## 2023-04-27 ENCOUNTER — PATIENT OUTREACH (OUTPATIENT)
Dept: ADMINISTRATIVE | Facility: HOSPITAL | Age: 38
End: 2023-04-27
Payer: COMMERCIAL

## 2023-05-24 ENCOUNTER — OFFICE VISIT (OUTPATIENT)
Dept: INTERNAL MEDICINE | Facility: CLINIC | Age: 38
End: 2023-05-24
Payer: COMMERCIAL

## 2023-05-24 ENCOUNTER — HOSPITAL ENCOUNTER (OUTPATIENT)
Dept: RADIOLOGY | Facility: HOSPITAL | Age: 38
Discharge: HOME | End: 2023-05-24
Attending: NURSE PRACTITIONER
Payer: COMMERCIAL

## 2023-05-24 VITALS
TEMPERATURE: 98 F | OXYGEN SATURATION: 98 % | HEART RATE: 89 BPM | DIASTOLIC BLOOD PRESSURE: 92 MMHG | SYSTOLIC BLOOD PRESSURE: 132 MMHG | BODY MASS INDEX: 38.64 KG/M2 | HEIGHT: 65 IN | WEIGHT: 231.94 LBS

## 2023-05-24 DIAGNOSIS — E66.9 OBESITY (BMI 35.0-39.9 WITHOUT COMORBIDITY): ICD-10-CM

## 2023-05-24 DIAGNOSIS — G56.01 CARPAL TUNNEL SYNDROME OF RIGHT WRIST: ICD-10-CM

## 2023-05-24 DIAGNOSIS — M79.642 BILATERAL HAND PAIN: ICD-10-CM

## 2023-05-24 DIAGNOSIS — M79.641 BILATERAL HAND PAIN: Primary | ICD-10-CM

## 2023-05-24 DIAGNOSIS — M79.641 BILATERAL HAND PAIN: ICD-10-CM

## 2023-05-24 DIAGNOSIS — M79.642 BILATERAL HAND PAIN: Primary | ICD-10-CM

## 2023-05-24 DIAGNOSIS — R03.0 ELEVATED BLOOD PRESSURE READING IN OFFICE WITHOUT DIAGNOSIS OF HYPERTENSION: ICD-10-CM

## 2023-05-24 PROCEDURE — 73130 X-RAY EXAM OF HAND: CPT | Mod: 26,50,, | Performed by: RADIOLOGY

## 2023-05-24 PROCEDURE — 3080F DIAST BP >= 90 MM HG: CPT | Mod: CPTII,S$GLB,, | Performed by: NURSE PRACTITIONER

## 2023-05-24 PROCEDURE — 3075F SYST BP GE 130 - 139MM HG: CPT | Mod: CPTII,S$GLB,, | Performed by: NURSE PRACTITIONER

## 2023-05-24 PROCEDURE — 99214 OFFICE O/P EST MOD 30 MIN: CPT | Mod: S$GLB,,, | Performed by: NURSE PRACTITIONER

## 2023-05-24 PROCEDURE — 3080F PR MOST RECENT DIASTOLIC BLOOD PRESSURE >= 90 MM HG: ICD-10-PCS | Mod: CPTII,S$GLB,, | Performed by: NURSE PRACTITIONER

## 2023-05-24 PROCEDURE — 3075F PR MOST RECENT SYSTOLIC BLOOD PRESS GE 130-139MM HG: ICD-10-PCS | Mod: CPTII,S$GLB,, | Performed by: NURSE PRACTITIONER

## 2023-05-24 PROCEDURE — 3008F BODY MASS INDEX DOCD: CPT | Mod: CPTII,S$GLB,, | Performed by: NURSE PRACTITIONER

## 2023-05-24 PROCEDURE — 99999 PR PBB SHADOW E&M-EST. PATIENT-LVL V: ICD-10-PCS | Mod: PBBFAC,,, | Performed by: NURSE PRACTITIONER

## 2023-05-24 PROCEDURE — 73130 XR HAND COMPLETE 3 VIEWS BILATERAL: ICD-10-PCS | Mod: 26,50,, | Performed by: RADIOLOGY

## 2023-05-24 PROCEDURE — 73130 X-RAY EXAM OF HAND: CPT | Mod: TC,50,PO

## 2023-05-24 PROCEDURE — 1160F RVW MEDS BY RX/DR IN RCRD: CPT | Mod: CPTII,S$GLB,, | Performed by: NURSE PRACTITIONER

## 2023-05-24 PROCEDURE — 99214 PR OFFICE/OUTPT VISIT, EST, LEVL IV, 30-39 MIN: ICD-10-PCS | Mod: S$GLB,,, | Performed by: NURSE PRACTITIONER

## 2023-05-24 PROCEDURE — 1159F MED LIST DOCD IN RCRD: CPT | Mod: CPTII,S$GLB,, | Performed by: NURSE PRACTITIONER

## 2023-05-24 PROCEDURE — 3008F PR BODY MASS INDEX (BMI) DOCUMENTED: ICD-10-PCS | Mod: CPTII,S$GLB,, | Performed by: NURSE PRACTITIONER

## 2023-05-24 PROCEDURE — 1159F PR MEDICATION LIST DOCUMENTED IN MEDICAL RECORD: ICD-10-PCS | Mod: CPTII,S$GLB,, | Performed by: NURSE PRACTITIONER

## 2023-05-24 PROCEDURE — 99999 PR PBB SHADOW E&M-EST. PATIENT-LVL V: CPT | Mod: PBBFAC,,, | Performed by: NURSE PRACTITIONER

## 2023-05-24 PROCEDURE — 1160F PR REVIEW ALL MEDS BY PRESCRIBER/CLIN PHARMACIST DOCUMENTED: ICD-10-PCS | Mod: CPTII,S$GLB,, | Performed by: NURSE PRACTITIONER

## 2023-05-24 RX ORDER — MELOXICAM 15 MG/1
15 TABLET ORAL DAILY
Qty: 30 TABLET | Refills: 0 | Status: SHIPPED | OUTPATIENT
Start: 2023-05-24 | End: 2023-08-07

## 2023-05-24 NOTE — PROGRESS NOTES
Subjective:       Patient ID: Shruti Mercado is a 38 y.o. female.    Chief Complaint: Arthritis    Mrs. Mercado presents to clinic for bilateral hand/joint pain x several months. Pain is most prominent to bilateral wrist, right CMP of thumb, and right MCP of fifth finger. Pain is constant and waking her up at night. Pain worsening with activity such as holding a pen and tenderness to touch. Intermittent numbness from right wrist into right hand with intermittent decrease  strength. Denies joint swelling, erythema, or warmth. Tried Aleve arthritis and ibuprofen 800 without relief. She works in [employer], using computer/keyboard constantly and sleeps at night with wrist bent. Denies history of hand/wrist pain. Also notes, her great grandmother had lupus and unsure if her mother has osteoarthritis or RA.    Arthritis  Presents for initial visit. She reports no joint swelling. Affected locations include the right wrist, left wrist and right MCP. Her pain is at a severity of 4/10. Associated symptoms include pain at night and pain while resting. Pertinent negatives include no diarrhea, dry eyes, dry mouth, dysuria, fatigue, fever, rash, Raynaud's syndrome or weight loss. There is no history of chronic back pain, lupus, osteoarthritis, psoriasis or rheumatoid arthritis.   Her pertinent risk factors include overuse. Her family medical history includes family history of lupus (great grandmother) and family history of osteoarthritis (mother). She shows no family history of psoriasis or family history of rheumatoid arthritis. Past treatments include NSAIDs. The treatment provided no relief. Factors aggravating her arthritis include activity and gripping.     Patient Active Problem List   Diagnosis    GERD (gastroesophageal reflux disease)    Pseudotumor cerebri    Migraine without aura and without status migrainosus, not intractable    Obesity (BMI 35.0-39.9 without comorbidity)    Chronic fatigue    History of sleeve  gastrectomy    Tension headache    Diverticulitis       Family History   Problem Relation Age of Onset    Hypertension Mother     Hypertension Father     Diabetes Father     Cancer Sister     No Known Problems Daughter     No Known Problems Son     No Known Problems Sister     No Known Problems Sister     No Known Problems Sister     Cancer Paternal Grandfather     Stroke Paternal Grandfather      Past Surgical History:   Procedure Laterality Date    ABSCESS DRAINAGE      ENDOMETRIAL ABLATION  02/22/2019    HIP SURGERY Right     LAPAROSCOPIC TUBAL LIGATION W/ ANTOINETTE CLIPS  2008    OVARIAN CYST SURGERY      sleeve      TUBAL LIGATION           Current Outpatient Medications:     methazoLAMIDE (NEPTAZANE) 50 MG Tab, Take 1 tablet (50 mg total) by mouth 2 (two) times daily., Disp: 60 tablet, Rfl: 11    multivitamin capsule, Take 1 capsule by mouth once daily., Disp: , Rfl:     rimegepant 75 mg odt, Take 1 tablet (75 mg total) by mouth as needed for Migraine (max dose 3 doses within 1 week). Place ODT tablet on the tongue; alternatively the ODT tablet may be placed under the tongue, Disp: 16 tablet, Rfl: 5    topiramate (TOPAMAX) 50 MG tablet, Take 1 tablet (50 mg total) by mouth 2 (two) times daily. Take 1/2 tablet twice a day for 1 week then full tablet twice a day thereafter., Disp: 186 tablet, Rfl: 3    meloxicam (MOBIC) 15 MG tablet, Take 1 tablet (15 mg total) by mouth once daily., Disp: 30 tablet, Rfl: 0    Review of Systems   Constitutional:  Negative for chills, fatigue, fever and weight loss.   Gastrointestinal:  Negative for diarrhea.   Genitourinary:  Negative for dysuria.   Musculoskeletal:  Positive for arthralgias and arthritis. Negative for joint swelling and myalgias.   Skin:  Negative for color change, rash and wound.   Neurological:  Positive for weakness (intermittent to right hand) and numbness.     Objective:   BP (!) 132/92 (BP Location: Left arm, Patient Position: Sitting)   Pulse 89   Temp  "97.7 °F (36.5 °C)   Ht 5' 4.5" (1.638 m)   Wt 105.2 kg (231 lb 14.8 oz)   SpO2 98%   BMI 39.19 kg/m²      Physical Exam  Constitutional:       General: She is not in acute distress.     Appearance: Normal appearance. She is obese. She is not ill-appearing.   HENT:      Head: Normocephalic.   Cardiovascular:      Rate and Rhythm: Normal rate.   Pulmonary:      Effort: Pulmonary effort is normal. No respiratory distress.   Musculoskeletal:      Right wrist: No swelling, deformity, effusion, lacerations or crepitus. Normal range of motion. Normal pulse.      Left wrist: No swelling, deformity, effusion, lacerations or crepitus. Normal range of motion. Normal pulse.      Right hand: Tenderness (mild TTP to right first CMP) present. No swelling, deformity or lacerations. Normal range of motion. Normal strength. Normal sensation. There is no disruption of two-point discrimination. Normal capillary refill. Normal pulse.      Left hand: No swelling, deformity, lacerations or tenderness. Normal range of motion. Normal strength. Normal sensation. There is no disruption of two-point discrimination. Normal capillary refill. Normal pulse.      Comments: +Tinel right wrist, negative phalen's test  No warmth, erythema, or swelling noted to hand   Skin:     General: Skin is warm and dry.      Capillary Refill: Capillary refill takes less than 2 seconds.      Findings: No erythema or rash.   Neurological:      Mental Status: She is alert and oriented to person, place, and time.      Motor: No weakness.      Coordination: Coordination normal. Rapid alternating movements normal.      Gait: Gait normal.   Psychiatric:         Mood and Affect: Mood normal.         Behavior: Behavior normal.       Assessment & Plan     1. Bilateral hand pain  Comments:  Xray today. Due to family history, check RF and CCP. Start meloxicam. Apply warm compresses as needed    Orders:  -     RHEUMATOID FACTOR; Future; Expected date: 05/24/2023  -     " "X-Ray Hand 3 View Bilateral; Future; Expected date: 05/24/2023  -     CYCLIC CITRUL PEPTIDE ANTIBODY, IGG; Future; Expected date: 05/24/2023  -     meloxicam (MOBIC) 15 MG tablet; Take 1 tablet (15 mg total) by mouth once daily.  Dispense: 30 tablet; Refill: 0    2. Carpal tunnel syndrome of right wrist  Comments:  +Tinel's sign with intermittent numbness and weakness to right hand. Start wrist splinting at night, obtain OTC splint. Handout on CTS exercises provided. RTC if symptoms do not improve  Orders:  -     X-Ray Hand 3 View Bilateral; Future; Expected date: 05/24/2023    3. Elevated blood pressure reading in office without diagnosis of hypertension  Comments:  Blood pressure elevated today in clinic, attributes to a stressful day at work. No prior history of HTN.  Monitor blood pressure at home and notify clinic of readings    4. Obesity (BMI 35.0-39.9 without comorbidity)  Assessment & Plan:  Counseled on importance of diet and exercise. Encouraged patient to have an active lifestyle with regular exercise with healthy eating.         CONNER Ivey      Portions of this note may have been created with voice recognition software. Occasional "wrong-word" or "sound-a-like" substitutions may have occurred due to the inherent limitations of voice recognition software. Please, read the note carefully and recognize, using context, where substitutions have occurred.     "

## 2023-05-25 NOTE — ASSESSMENT & PLAN NOTE
Counseled on importance of diet and exercise. Encouraged patient to have an active lifestyle with regular exercise with healthy eating.

## 2023-08-07 DIAGNOSIS — M79.641 BILATERAL HAND PAIN: ICD-10-CM

## 2023-08-07 DIAGNOSIS — M79.642 BILATERAL HAND PAIN: ICD-10-CM

## 2023-08-07 RX ORDER — MELOXICAM 15 MG/1
15 TABLET ORAL
Qty: 30 TABLET | Refills: 0 | Status: SHIPPED | OUTPATIENT
Start: 2023-08-07

## 2023-10-16 ENCOUNTER — LAB VISIT (OUTPATIENT)
Dept: LAB | Facility: HOSPITAL | Age: 38
End: 2023-10-16
Attending: NURSE PRACTITIONER
Payer: COMMERCIAL

## 2023-10-16 DIAGNOSIS — E66.9 OBESITY, UNSPECIFIED: ICD-10-CM

## 2023-10-16 DIAGNOSIS — R19.7 DIARRHEA OF PRESUMED INFECTIOUS ORIGIN: ICD-10-CM

## 2023-10-16 DIAGNOSIS — R10.9 STOMACH ACHE: ICD-10-CM

## 2023-10-16 LAB
BASOPHILS # BLD AUTO: 0.03 K/UL (ref 0–0.2)
BASOPHILS NFR BLD: 0.4 % (ref 0–1.9)
DIFFERENTIAL METHOD: NORMAL
EOSINOPHIL # BLD AUTO: 0.2 K/UL (ref 0–0.5)
EOSINOPHIL NFR BLD: 2.7 % (ref 0–8)
ERYTHROCYTE [DISTWIDTH] IN BLOOD BY AUTOMATED COUNT: 11.7 % (ref 11.5–14.5)
HCT VFR BLD AUTO: 39.9 % (ref 37–48.5)
HGB BLD-MCNC: 13.4 G/DL (ref 12–16)
IMM GRANULOCYTES # BLD AUTO: 0.03 K/UL (ref 0–0.04)
IMM GRANULOCYTES NFR BLD AUTO: 0.4 % (ref 0–0.5)
LYMPHOCYTES # BLD AUTO: 1.8 K/UL (ref 1–4.8)
LYMPHOCYTES NFR BLD: 22.3 % (ref 18–48)
MCH RBC QN AUTO: 30.7 PG (ref 27–31)
MCHC RBC AUTO-ENTMCNC: 33.6 G/DL (ref 32–36)
MCV RBC AUTO: 91 FL (ref 82–98)
MONOCYTES # BLD AUTO: 0.6 K/UL (ref 0.3–1)
MONOCYTES NFR BLD: 7.6 % (ref 4–15)
NEUTROPHILS # BLD AUTO: 5.4 K/UL (ref 1.8–7.7)
NEUTROPHILS NFR BLD: 66.6 % (ref 38–73)
NRBC BLD-RTO: 0 /100 WBC
PLATELET # BLD AUTO: 250 K/UL (ref 150–450)
PMV BLD AUTO: 10 FL (ref 9.2–12.9)
RBC # BLD AUTO: 4.37 M/UL (ref 4–5.4)
WBC # BLD AUTO: 8.13 K/UL (ref 3.9–12.7)

## 2023-10-16 PROCEDURE — 36415 COLL VENOUS BLD VENIPUNCTURE: CPT | Mod: PO | Performed by: NURSE PRACTITIONER

## 2023-10-16 PROCEDURE — 85025 COMPLETE CBC W/AUTO DIFF WBC: CPT | Mod: PO | Performed by: NURSE PRACTITIONER

## 2023-10-16 PROCEDURE — 87449 NOS EACH ORGANISM AG IA: CPT | Performed by: NURSE PRACTITIONER

## 2023-10-16 PROCEDURE — 87045 FECES CULTURE AEROBIC BACT: CPT | Performed by: NURSE PRACTITIONER

## 2023-10-16 PROCEDURE — 87046 STOOL CULTR AEROBIC BACT EA: CPT | Performed by: NURSE PRACTITIONER

## 2023-10-16 PROCEDURE — 82705 FATS/LIPIDS FECES QUAL: CPT | Performed by: NURSE PRACTITIONER

## 2023-10-16 PROCEDURE — 87449 NOS EACH ORGANISM AG IA: CPT | Mod: 91 | Performed by: NURSE PRACTITIONER

## 2023-10-16 PROCEDURE — 87427 SHIGA-LIKE TOXIN AG IA: CPT | Mod: 59 | Performed by: NURSE PRACTITIONER

## 2023-10-16 PROCEDURE — 87329 GIARDIA AG IA: CPT | Performed by: NURSE PRACTITIONER

## 2023-10-17 LAB
C DIFF GDH STL QL: NEGATIVE
C DIFF TOX A+B STL QL IA: NEGATIVE
CRYPTOSP AG STL QL IA: NEGATIVE
G LAMBLIA AG STL QL IA: NEGATIVE

## 2023-10-18 LAB
E COLI SXT1 STL QL IA: NEGATIVE
E COLI SXT2 STL QL IA: NEGATIVE

## 2023-10-19 LAB
BACTERIA STL CULT: NORMAL
FAT STL QL: NORMAL
NEUTRAL FAT STL QL: NORMAL

## 2023-10-30 ENCOUNTER — PATIENT OUTREACH (OUTPATIENT)
Dept: ADMINISTRATIVE | Facility: HOSPITAL | Age: 38
End: 2023-10-30
Payer: COMMERCIAL

## 2023-10-30 ENCOUNTER — OFFICE VISIT (OUTPATIENT)
Dept: NEUROLOGY | Facility: CLINIC | Age: 38
End: 2023-10-30
Payer: COMMERCIAL

## 2023-10-30 VITALS
RESPIRATION RATE: 16 BRPM | HEIGHT: 63 IN | HEART RATE: 84 BPM | DIASTOLIC BLOOD PRESSURE: 89 MMHG | WEIGHT: 235.88 LBS | BODY MASS INDEX: 41.79 KG/M2 | SYSTOLIC BLOOD PRESSURE: 135 MMHG

## 2023-10-30 DIAGNOSIS — G43.009 MIGRAINE WITHOUT AURA AND WITHOUT STATUS MIGRAINOSUS, NOT INTRACTABLE: Primary | ICD-10-CM

## 2023-10-30 DIAGNOSIS — Z98.84 S/P GASTRIC BYPASS: ICD-10-CM

## 2023-10-30 DIAGNOSIS — G44.209 TENSION HEADACHE: ICD-10-CM

## 2023-10-30 DIAGNOSIS — G93.2 PSEUDOTUMOR CEREBRI: ICD-10-CM

## 2023-10-30 PROCEDURE — 99999 PR PBB SHADOW E&M-EST. PATIENT-LVL IV: ICD-10-PCS | Mod: PBBFAC,,, | Performed by: NURSE PRACTITIONER

## 2023-10-30 PROCEDURE — 1159F PR MEDICATION LIST DOCUMENTED IN MEDICAL RECORD: ICD-10-PCS | Mod: CPTII,S$GLB,, | Performed by: NURSE PRACTITIONER

## 2023-10-30 PROCEDURE — 3008F BODY MASS INDEX DOCD: CPT | Mod: CPTII,S$GLB,, | Performed by: NURSE PRACTITIONER

## 2023-10-30 PROCEDURE — 3075F SYST BP GE 130 - 139MM HG: CPT | Mod: CPTII,S$GLB,, | Performed by: NURSE PRACTITIONER

## 2023-10-30 PROCEDURE — 3079F PR MOST RECENT DIASTOLIC BLOOD PRESSURE 80-89 MM HG: ICD-10-PCS | Mod: CPTII,S$GLB,, | Performed by: NURSE PRACTITIONER

## 2023-10-30 PROCEDURE — 99214 OFFICE O/P EST MOD 30 MIN: CPT | Mod: S$GLB,,, | Performed by: NURSE PRACTITIONER

## 2023-10-30 PROCEDURE — 3075F PR MOST RECENT SYSTOLIC BLOOD PRESS GE 130-139MM HG: ICD-10-PCS | Mod: CPTII,S$GLB,, | Performed by: NURSE PRACTITIONER

## 2023-10-30 PROCEDURE — 99999 PR PBB SHADOW E&M-EST. PATIENT-LVL IV: CPT | Mod: PBBFAC,,, | Performed by: NURSE PRACTITIONER

## 2023-10-30 PROCEDURE — 3008F PR BODY MASS INDEX (BMI) DOCUMENTED: ICD-10-PCS | Mod: CPTII,S$GLB,, | Performed by: NURSE PRACTITIONER

## 2023-10-30 PROCEDURE — 1159F MED LIST DOCD IN RCRD: CPT | Mod: CPTII,S$GLB,, | Performed by: NURSE PRACTITIONER

## 2023-10-30 PROCEDURE — 1160F PR REVIEW ALL MEDS BY PRESCRIBER/CLIN PHARMACIST DOCUMENTED: ICD-10-PCS | Mod: CPTII,S$GLB,, | Performed by: NURSE PRACTITIONER

## 2023-10-30 PROCEDURE — 99214 PR OFFICE/OUTPT VISIT, EST, LEVL IV, 30-39 MIN: ICD-10-PCS | Mod: S$GLB,,, | Performed by: NURSE PRACTITIONER

## 2023-10-30 PROCEDURE — 1160F RVW MEDS BY RX/DR IN RCRD: CPT | Mod: CPTII,S$GLB,, | Performed by: NURSE PRACTITIONER

## 2023-10-30 PROCEDURE — 3079F DIAST BP 80-89 MM HG: CPT | Mod: CPTII,S$GLB,, | Performed by: NURSE PRACTITIONER

## 2023-10-30 NOTE — PROGRESS NOTES
Subjective:       Patient ID: Shruti Mercado is a 38 y.o. female.    Chief Complaint: migraine without aura and without status migrainosus, not i and head pressure     HPI       BACKGROUND HISTORY      The patient was referred by Dr. Espinosa for evaluation.    The patient has been having holocephalic throbbing 8-10/10 headaches lasting for 24 hours and associated with nausea and light sensitivity, visual obscurations and tinnitus for 2-3 years. In 2017 she was found to have B/L papilledema with unremarkable Brain MRI/MRA and OP of 20 cm H2O. She is on Diamox 500 mg BID but she takes it QOD because he does not like the SEs of numbness though it helps with her symptoms. The diagnosis was questionable. Tried to change to TPM XR (Trokendi) XR.   Have not seen her since 2018 (2018 to 2021) and today is 01-. She was rarely taking Diamox 500 mg (once or twice a week) due to SEs. Dr. Espinosa added TPM and she was only taking TPM 25 mg QHS which seems to help with her headaches. Her last eye exam was on 05- and showed 1+ papilledema. Denies loss of vision and headaches have improved. Remained concerned about IC lesion because of family history. The patient did not want to revisit the diagnosis.If she has PTC/IIH she was basically doing well untreated. Tried to change Diamox (AZM) to 125 mg TID to help with SEs and compliance and consistent use. Explained to her my concern with TPM-AZM combination. Ordered Brain MRI WWO and MRV WWO.       On 02- Brain MRI WWO NL. Brain MRV  NL. Due to non-compliance with Diamox (AZM), she was prescribed TPM 25 mg titration to BID.  She stopped the titration after she was prescribed antibiotics. Nurtec 75 mg ODT has helped tremendously aborting the occasional  migraines headaches. Does endorse frontal and occipital tension headaches.    INTERVAL HISTORY 10-      Patient is new to me but known to Dr. Harry. Patient is present for Headache management. Patient reports she  did not tolerate TPM 25 mg po BID,  caused brain fog and cognitive slowing. Patient was changed to Methazolamide Neptazane 50 mg po BID per Dr. Harry in 02/2023. Patient reports she has only been taking medication at night. Patient reports she had a fear of mixing medications.Patient reports headaches are increased frequency and intensity.   Patient taking Nurtec 75 mg ODT has helped tremendously aborting breakthrough migraines headaches. Does endorse frontal and occipital tension headaches. Discussed with the patient importance of compliance with treatment. Patient verbalized understanding.          Review of Systems   Constitutional:  Negative for appetite change and fatigue.   HENT:  Negative for hearing loss and tinnitus.    Eyes:  Negative for photophobia and visual disturbance.   Respiratory:  Negative for apnea and shortness of breath.    Cardiovascular:  Negative for chest pain and palpitations.   Gastrointestinal:  Negative for nausea and vomiting.   Endocrine: Negative for cold intolerance and heat intolerance.   Genitourinary:  Negative for difficulty urinating and urgency.   Musculoskeletal:  Negative for arthralgias, back pain, gait problem, joint swelling, myalgias, neck pain and neck stiffness.   Skin:  Negative for color change and rash.   Allergic/Immunologic: Negative for environmental allergies and immunocompromised state.   Neurological:  Positive for headaches. Negative for dizziness, tremors, seizures, syncope, facial asymmetry, speech difficulty, weakness, light-headedness and numbness.   Hematological:  Negative for adenopathy. Does not bruise/bleed easily.   Psychiatric/Behavioral:  Negative for agitation, behavioral problems, confusion, decreased concentration, dysphoric mood, hallucinations, self-injury, sleep disturbance and suicidal ideas. The patient is nervous/anxious. The patient is not hyperactive.          Current Outpatient Medications:     meloxicam (MOBIC) 15 MG tablet, Take 1  tablet by mouth once daily, Disp: 30 tablet, Rfl: 0    methazoLAMIDE (NEPTAZANE) 50 MG Tab, Take 1 tablet (50 mg total) by mouth 2 (two) times daily., Disp: 60 tablet, Rfl: 11    multivitamin capsule, Take 1 capsule by mouth once daily., Disp: , Rfl:     rimegepant 75 mg odt, Take 1 tablet (75 mg total) by mouth as needed for Migraine (max dose 3 doses within 1 week). Place ODT tablet on the tongue; alternatively the ODT tablet may be placed under the tongue, Disp: 16 tablet, Rfl: 5  Past Medical History:   Diagnosis Date    Migraines     Morbid obesity     Pseudotumor cerebri      Past Surgical History:   Procedure Laterality Date    ABSCESS DRAINAGE      ENDOMETRIAL ABLATION  02/22/2019    HIP SURGERY Right     LAPAROSCOPIC TUBAL LIGATION W/ ANTOINETTE CLIPS  2008    OVARIAN CYST SURGERY      sleeve      TUBAL LIGATION       Social History     Socioeconomic History    Marital status:    Tobacco Use    Smoking status: Never    Smokeless tobacco: Never   Substance and Sexual Activity    Alcohol use: No    Drug use: No    Sexual activity: Yes   Social History Narrative    , 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        Objective:     VITAL SIGNS REVIEWED         GENERAL APPEARANCE:         The patient looks comfortable and anxious    BMI 39.16     No signs of medical or psychiatric distress.    Normal breathing pattern.    No dysmorphic features    Normal eye contact.     GENERAL MEDICAL EXAM:      HEENT:  Head is atraumatic normocephalic. No tender temporal arteries.  Fundoscopic exam in normal.     Neck and Axillae: No JVD.    Cardiopulmonary: No cyanosis. No tachypnea. Normal respiratory effort.    Gastrointestinal:  No stomas or lesions. No hernias.    Skin, Hair and Nails: No pathognonomic skin rash. No neurofibromatosis. No stigmata of autoimmune disease.     Limbs: No varicose veins. No edema.     Muskoskeletal: No deformities.No signs of longstanding neuropathy. No dislocations or fractures.        Neurologic  Exam     Mental Status   Oriented to person, place, and time.   Follows 3 step commands.   Attention: normal. Concentration: normal.   Speech: speech is normal   Level of consciousness: alert  Able to perform simple calculations.   Able to name object. Able to repeat. Normal comprehension.     Cranial Nerves   Cranial nerves II through XII intact.     CN II   Visual fields full to confrontation.   Visual acuity: normal  Right visual field deficit: none  Left visual field deficit: none     CN III, IV, VI   Pupils are equal, round, and reactive to light.  Extraocular motions are normal.   Right pupil: Size: 2 mm. Shape: regular. Reactivity: brisk. Consensual response: intact. Accommodation: intact.   Left pupil: Size: 2 mm. Shape: regular. Reactivity: brisk. Consensual response: intact. Accommodation: intact.   CN III: no CN III palsy  CN VI: no CN VI palsy  Nystagmus: none   Diplopia: none  Ophthalmoparesis: none  Upgaze: normal  Downgaze: normal  Conjugate gaze: present  Vestibulo-ocular reflex: present    CN V   Facial sensation intact.   Right facial sensation deficit: none  Left facial sensation deficit: none    CN VII   Facial expression full, symmetric.   Right facial weakness: none  Left facial weakness: none    CN VIII   CN VIII normal.   Hearing: intact    CN IX, X   CN IX normal.   CN X normal.   Palate: symmetric    CN XI   CN XI normal.   Right sternocleidomastoid strength: normal  Left sternocleidomastoid strength: normal  Right trapezius strength: normal  Left trapezius strength: normal    CN XII   CN XII normal.   Tongue: not atrophic  Fasciculations: absent  Tongue deviation: none         Motor Exam   Muscle bulk: normal  Overall muscle tone: normal  Right arm tone: normal  Left arm tone: normal  Right arm pronator drift: absent  Left arm pronator drift: absent  Right leg tone: normal  Left leg tone: normal    Strength   Strength 5/5 throughout.   Right neck flexion: 5/5  Left neck flexion: 5/5  Right  neck extension: 5/5  Left neck extension: 5/5  Right deltoid: 5/5  Left deltoid: 5/5  Right biceps: 5/5  Left biceps: 5/5  Right triceps: 5/5  Left triceps: 5/  Right wrist flexion: 5/  Left wrist flexion: 5/5  Right wrist extension: 5/  Left wrist extension:   Right interossei: 5/  Left interossei: 5/  Right iliopsoas:   Left iliopsoas:   Right quadriceps:   Left quadriceps: 5/  Right hamstrin  Left hamstrin/5  Right glutei:   Left glutei:   Right anterior tibial:   Left anterior tibial:   Right posterior tibial:   Left posterior tibial:   Right peroneal:   Left peroneal:   Right gastroc:   Left gastroc:     Sensory Exam   Light touch normal.   Right arm light touch: normal  Left arm light touch: normal  Right leg light touch: normal  Left leg light touch: normal  Vibration normal.   Right arm vibration: normal  Left arm vibration: normal  Right leg vibration: normal  Left leg vibration: normal  Proprioception normal.   Right arm proprioception: normal  Left arm proprioception: normal  Right leg proprioception: normal  Left leg proprioception: normal  Pinprick normal.   Right arm pinprick: normal  Left arm pinprick: normal  Right leg pinprick: normal  Left leg pinprick: normal  Graphesthesia: normal  Stereognosis: normal    Gait, Coordination, and Reflexes     Gait  Gait: normal    Coordination   Romberg: negative  Finger to nose coordination: normal  Heel to shin coordination: normal  Tandem walking coordination: normal    Tremor   Resting tremor: absent  Intention tremor: absent  Action tremor: absent    Reflexes   Right brachioradialis: 2+  Left brachioradialis: 2+  Right biceps: 2+  Left biceps: 2+  Right triceps: 2+  Left triceps: 2+  Right patellar: 2+  Left patellar: 2+  Right achilles: 2+  Left achilles: 2+  Right plantar: normal  Left plantar: normal  Right Kumari: absent  Left Kumari: absent  Right ankle clonus: absent  Left ankle clonus:  absent  Right pendular knee jerk: absent  Left pendular knee jerk: absent          Lab Results   Component Value Date    WBC 8.13 10/16/2023    HGB 13.4 10/16/2023    HCT 39.9 10/16/2023    MCV 91 10/16/2023     10/16/2023     Sodium   Date Value Ref Range Status   02/27/2023 140 136 - 145 mmol/L Final     Potassium   Date Value Ref Range Status   02/27/2023 3.5 3.5 - 5.1 mmol/L Final     Chloride   Date Value Ref Range Status   02/27/2023 107 95 - 110 mmol/L Final     CO2   Date Value Ref Range Status   02/27/2023 19 (L) 23 - 29 mmol/L Final     Glucose   Date Value Ref Range Status   02/27/2023 83 70 - 110 mg/dL Final     BUN   Date Value Ref Range Status   02/27/2023 9 6 - 20 mg/dL Final     Creatinine   Date Value Ref Range Status   02/27/2023 0.8 0.5 - 1.4 mg/dL Final     Calcium   Date Value Ref Range Status   02/27/2023 8.8 8.7 - 10.5 mg/dL Final     Total Protein   Date Value Ref Range Status   02/27/2023 6.7 6.0 - 8.4 g/dL Final     Albumin   Date Value Ref Range Status   02/27/2023 3.4 (L) 3.5 - 5.2 g/dL Final     Total Bilirubin   Date Value Ref Range Status   02/27/2023 0.6 0.1 - 1.0 mg/dL Final     Comment:     For infants and newborns, interpretation of results should be based  on gestational age, weight and in agreement with clinical  observations.    Premature Infant recommended reference ranges:  Up to 24 hours.............<8.0 mg/dL  Up to 48 hours............<12.0 mg/dL  3-5 days..................<15.0 mg/dL  6-29 days.................<15.0 mg/dL       Alkaline Phosphatase   Date Value Ref Range Status   02/27/2023 49 (L) 55 - 135 U/L Final     AST   Date Value Ref Range Status   02/27/2023 10 10 - 40 U/L Final     ALT   Date Value Ref Range Status   02/27/2023 11 10 - 44 U/L Final     Anion Gap   Date Value Ref Range Status   02/27/2023 14 8 - 16 mmol/L Final     eGFR if    Date Value Ref Range Status   09/08/2020 >60.0 >60 mL/min/1.73 m^2 Final     eGFR if non African  American   Date Value Ref Range Status   09/08/2020 >60.0 >60 mL/min/1.73 m^2 Final     Comment:     Calculation used to obtain the estimated glomerular filtration  rate (eGFR) is the CKD-EPI equation.        Lab Results   Component Value Date    [id number] 696 10/22/2015     Lab Results   Component Value Date    TSH 2.21 12/06/2021    FREET4 0.78 08/07/2015           2017 RECORDS     Brain MRI MRA NL    CSF OP 20 cm H2O???        02-     Brain MRI WWO NL     Brain MRV  NL      02-    Overall the LP is normal with acceptable opening pressure and normal CSF.     In her case I would consider any opening pressure <27.5 normal (her opening pressure was 26).    Reviewed the neuroimaging independently       Assessment:       1. Migraine without aura and without status migrainosus, not intractable    2. Tension headache    3. Pseudotumor cerebri    4. S/P gastric bypass          Plan:           S/P TUBAL LIGATION       CHRONIC MIXED HEADACHES: MIGRAINE WITH TENSION     UNSUBSTANTIATED INTRACRANIAL HYPERTENSION WITH NORMAL OPENING PRESSURE AND PSEUDOPAPILLEDEMA        Evaluate    MRI BRAIN WO     Discontinue TPM 50 mg BID. Intolerable side effects.    Continue Methazolamide 50 mg BID (takes as directed)     Continue Nurtec 75 mg ODT PRN.                   MEDICAL/SURGICAL COMORBIDITIES     All relevant medical comorbidities noted and managed by primary care physician and medical care team.        HEALTHY LIFESTYLE AND PREVENTATIVE CARE     Encouraged the patient to adhere to the age-appropriate health maintenance guidelines including screening tests and vaccinations.      Discussed the overall importance of healthy lifestyle, optimal weight, exercise, healthy diet, good sleep hygiene and avoiding drugs including smoking, alcohol and recreational drugs. The patient verbalized full understanding.       RTC in 3 months      Rhett Alvarez, MSN NP      Collaborating Provider: Shahla Harry MD, FAAN  Neurologist/Epileptologist      I spent a total of 35 minutes on the day of the visit.  This includes face to face time and non-face to face time preparing to see the patient (eg, review of tests), obtaining and/or reviewing separately obtained history, documenting clinical information in the electronic or other health record, independently interpreting results and communicating results to the patient/family/caregiver, or care coordinator.

## 2024-02-20 ENCOUNTER — TELEPHONE (OUTPATIENT)
Dept: NEUROLOGY | Facility: CLINIC | Age: 39
End: 2024-02-20
Payer: COMMERCIAL

## 2024-02-20 NOTE — TELEPHONE ENCOUNTER
----- Message from Adilene Tan sent at 2/20/2024  2:55 PM CST -----  Contact: Shruti Bahena would like a call back at [phone] in regards to needing to speak with nurse about starting to have episodes starting on 1/27/24, next one was on 2/14/24. Patient would like to know what to do.  Thanks   Am

## 2024-02-20 NOTE — TELEPHONE ENCOUNTER
Lightheaded episodes if she turn a certain way  ears start ring and she start spinning . She stated she starts spinning and she delusional after . Pt scheduled for virtual on 2/21 at 9:30

## 2024-02-21 ENCOUNTER — OFFICE VISIT (OUTPATIENT)
Dept: NEUROLOGY | Facility: CLINIC | Age: 39
End: 2024-02-21
Payer: COMMERCIAL

## 2024-02-21 DIAGNOSIS — Z90.3 HISTORY OF SLEEVE GASTRECTOMY: ICD-10-CM

## 2024-02-21 DIAGNOSIS — R53.82 CHRONIC FATIGUE: ICD-10-CM

## 2024-02-21 DIAGNOSIS — E66.9 OBESITY (BMI 35.0-39.9 WITHOUT COMORBIDITY): ICD-10-CM

## 2024-02-21 DIAGNOSIS — K21.9 GASTROESOPHAGEAL REFLUX DISEASE, UNSPECIFIED WHETHER ESOPHAGITIS PRESENT: ICD-10-CM

## 2024-02-21 DIAGNOSIS — R29.818 OTHER SYMPTOMS AND SIGNS INVOLVING THE NERVOUS SYSTEM: ICD-10-CM

## 2024-02-21 DIAGNOSIS — G93.2 PSEUDOTUMOR CEREBRI: ICD-10-CM

## 2024-02-21 DIAGNOSIS — G44.209 TENSION HEADACHE: ICD-10-CM

## 2024-02-21 DIAGNOSIS — G43.009 MIGRAINE WITHOUT AURA AND WITHOUT STATUS MIGRAINOSUS, NOT INTRACTABLE: ICD-10-CM

## 2024-02-21 DIAGNOSIS — R40.4 AWARENESS ALTERATION, TRANSIENT: Primary | ICD-10-CM

## 2024-02-21 PROBLEM — K57.92 DIVERTICULITIS: Status: RESOLVED | Noted: 2023-02-26 | Resolved: 2024-02-21

## 2024-02-21 PROCEDURE — 99215 OFFICE O/P EST HI 40 MIN: CPT | Mod: 95,,, | Performed by: PSYCHIATRY & NEUROLOGY

## 2024-02-21 NOTE — PROGRESS NOTES
Subjective:       Patient ID: Shruti Mercado is a 39 y.o. female.    Chief Complaint: Dizziness    HPI       BACKGROUND HISTORY      The patient was referred by Dr. Espinosa for evaluation.    The patient has been having holocephalic throbbing 8-10/10 headaches lasting for 24 hours and associated with nausea and light sensitivity, visual obscurations and tinnitus for 2-3 years. In 2017 she was found to have B/L papilledema with unremarkable Brain MRI/MRA and OP of 20 cm H2O. She is on Diamox 500 mg BID but she takes it QOD because he does not like the SEs of numbness though it helps with her symptoms. The diagnosis was questionable. Tried to change to TPM XR (Trokendi) XR.   Have not seen her since 2018 (2018 to 2021) and today is 01-. She was rarely taking Diamox 500 mg (once or twice a week) due to SEs. Dr. Espinosa added TPM and she was only taking TPM 25 mg QHS which seems to help with her headaches. Her last eye exam was on 05- and showed 1+ papilledema. Denies loss of vision and headaches have improved. Remained concerned about IC lesion because of family history. The patient did not want to revisit the diagnosis.If she has PTC/IIH she was basically doing well untreated. Tried to change Diamox (AZM) to 125 mg TID to help with SEs and compliance and consistent use. Explained to her my concern with TPM-AZM combination. Ordered Brain MRI WWO and MRV WWO. On 02- Brain MRI WWO NL. Brain MRV  NL. Due to non-compliance with Diamox (AZM), she was prescribed TPM 25 mg titration to BID.  She stopped the titration after she was prescribed antibiotics. Nurtec 75 mg ODT has helped tremendously aborting the occasional  migraines headaches. Did endorse frontal and occipital tension headaches.  The patient does not want to revisit the diagnosis via LP. Restarted TPM and titrate slowly to 50 mg BID. Explained to her that TPM will work for migraine, tension and even in lowering ICP and stressed the importance of  compliance.  Continued Nurtec 75 mg ODT PRN.     INTERVAL HISTORY       Nurtec 75 mg ODT has helped tremendously aborting the occasional  migraines headaches. We had to change TPM to Methazolamide 50 mg BID as our last medical resort.     Was doing well till 01- when she experienced a spell during driving characterized by loud buzzing sound in her ears followed by headache, INDIO and palpitations. The spell recurred on 02- while she was standing up washing disches. Both episodes were not witnessed                The originating site (patient location) is: Home.      The distant site (neurologist location) is: [facility] at [facility].      The chief complaint leading to consultation is: IIH, NEW SPELLS       Visit type: Virtual visit with synchronous audio and video.      Consent: The patient verbally consented to participating in the video visit and informed that may decline to receive medical services by telemedicine and may withdraw from such care at any time.      I discussed with the patient the nature of our telemedicine visits, that:      I  would evaluate the patient and recommend diagnostics and treatments based on my assessment.    Our sessions are not being recorded and that personal health information is protected.    Our team would provide follow up care in person if/when the patient needs it.    Virtual (video/telemedicine) visits have significant limitations. A telemedicine exam is primarily focused on the history and what I can observe. Several critical parts of the neurological exam cannot be performed.          Review of Systems   Constitutional:  Negative for appetite change and fatigue.   HENT:  Positive for tinnitus. Negative for hearing loss.    Eyes:  Negative for photophobia and visual disturbance.   Respiratory:  Negative for apnea and shortness of breath.    Cardiovascular:  Negative for chest pain and palpitations.   Gastrointestinal:  Negative for nausea and  vomiting.   Endocrine: Negative for cold intolerance and heat intolerance.   Genitourinary:  Negative for difficulty urinating and urgency.   Musculoskeletal:  Negative for arthralgias, back pain, gait problem, joint swelling, myalgias, neck pain and neck stiffness.   Skin:  Negative for color change and rash.   Allergic/Immunologic: Negative for environmental allergies and immunocompromised state.   Neurological:  Positive for headaches. Negative for dizziness, tremors, seizures, syncope, facial asymmetry, speech difficulty, weakness, light-headedness and numbness.   Hematological:  Negative for adenopathy. Does not bruise/bleed easily.   Psychiatric/Behavioral:  Negative for agitation, behavioral problems, confusion, decreased concentration, dysphoric mood, hallucinations, self-injury, sleep disturbance and suicidal ideas. The patient is not hyperactive.          Current Outpatient Medications:     meloxicam (MOBIC) 15 MG tablet, Take 1 tablet by mouth once daily, Disp: 30 tablet, Rfl: 0    methazoLAMIDE (NEPTAZANE) 50 MG Tab, Take 1 tablet (50 mg total) by mouth 2 (two) times daily., Disp: 60 tablet, Rfl: 11    multivitamin capsule, Take 1 capsule by mouth once daily., Disp: , Rfl:     rimegepant 75 mg odt, Take 1 tablet (75 mg total) by mouth as needed for Migraine (max dose 3 doses within 1 week). Place ODT tablet on the tongue; alternatively the ODT tablet may be placed under the tongue, Disp: 16 tablet, Rfl: 5  Past Medical History:   Diagnosis Date    Migraines     Morbid obesity     Pseudotumor cerebri      Past Surgical History:   Procedure Laterality Date    ABSCESS DRAINAGE      ENDOMETRIAL ABLATION  02/22/2019    HIP SURGERY Right     LAPAROSCOPIC TUBAL LIGATION W/ ANTOINETTE ROSS  2008    OVARIAN CYST SURGERY      sleeve      TUBAL LIGATION       Social History     Socioeconomic History    Marital status:    Tobacco Use    Smoking status: Never    Smokeless tobacco: Never   Substance and Sexual  Activity    Alcohol use: No    Drug use: No    Sexual activity: Yes   Social History Narrative    , 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        Objective:               PLEASE NOTE THAT THE FOLLOWING PHYSICAL AND NEUROLOGICAL EXAMINATION IS BASED UPON THE LAST FACE-TO-FACE VISIT WITH THE PATIENT       TELEMETRIC (VIRTUAL) VISITS DO NOT ALLOW PROPER PHYSICAL AND NEUROLOGICAL EXAMINATIONS         VITAL SIGNS REVIEWED         GENERAL APPEARANCE:         The patient looks comfortable.    BMI 39.16     No signs of medical or psychiatric distress.    Normal breathing pattern.    No dysmorphic features    Normal eye contact.     GENERAL MEDICAL EXAM:      HEENT:  Head is atraumatic normocephalic. No tender temporal arteries.      Funduscopic exam in normal.     Neck and Axillae: No JVD.    Cardiopulmonary: No cyanosis. No tachypnea. Normal respiratory effort.    Gastrointestinal:  No stomas or lesions. No hernias.    Skin, Hair and Nails: No pathognonomic skin rash. No neurofibromatosis. No stigmata of autoimmune disease.     Limbs: No varicose veins. No edema.     Muskoskeletal: No deformities.No signs of longstanding neuropathy. No dislocations or fractures.        Neurologic Exam     Mental Status   Oriented to person, place, and time.   Follows 3 step commands.   Attention: normal. Concentration: normal.   Speech: speech is normal   Level of consciousness: alert  Able to perform simple calculations.   Able to name object. Able to repeat. Normal comprehension.     Cranial Nerves   Cranial nerves II through XII intact.     CN II   Visual fields full to confrontation.   Visual acuity: normal  Right visual field deficit: none  Left visual field deficit: none     CN III, IV, VI   Pupils are equal, round, and reactive to light.  Extraocular motions are normal.   Right pupil: Size: 2 mm. Shape: regular. Reactivity: brisk. Consensual response: intact. Accommodation: intact.   Left pupil: Size: 2 mm. Shape: regular. Reactivity:  brisk. Consensual response: intact. Accommodation: intact.   CN III: no CN III palsy  CN VI: no CN VI palsy  Nystagmus: none   Diplopia: none  Ophthalmoparesis: none  Upgaze: normal  Downgaze: normal  Conjugate gaze: present  Vestibulo-ocular reflex: present    CN V   Facial sensation intact.   Right facial sensation deficit: none  Left facial sensation deficit: none    CN VII   Facial expression full, symmetric.   Right facial weakness: none  Left facial weakness: none    CN VIII   CN VIII normal.   Hearing: intact    CN IX, X   CN IX normal.   CN X normal.   Palate: symmetric    CN XI   CN XI normal.   Right sternocleidomastoid strength: normal  Left sternocleidomastoid strength: normal  Right trapezius strength: normal  Left trapezius strength: normal    CN XII   CN XII normal.   Tongue: not atrophic  Fasciculations: absent  Tongue deviation: none         Motor Exam   Muscle bulk: normal  Overall muscle tone: normal  Right arm tone: normal  Left arm tone: normal  Right arm pronator drift: absent  Left arm pronator drift: absent  Right leg tone: normal  Left leg tone: normal    Strength   Strength 5/5 throughout.   Right neck flexion: 5/5  Left neck flexion: 5/5  Right neck extension: 5/5  Left neck extension: 5/5  Right deltoid: 5/5  Left deltoid: 5/5  Right biceps: 5/5  Left biceps: 5/5  Right triceps: 5/5  Left triceps: 5/5  Right wrist flexion: 5/5  Left wrist flexion: 5/5  Right wrist extension: 5/5  Left wrist extension: 5/5  Right interossei: 5/5  Left interossei: 5/5  Right iliopsoas: 5/5  Left iliopsoas: 5/5  Right quadriceps: 5/5  Left quadriceps: 5/5  Right hamstrin/5  Left hamstrin/5  Right glutei: 5/5  Left glutei: 5/5  Right anterior tibial: 5/5  Left anterior tibial: 5/5  Right posterior tibial: 5/5  Left posterior tibial: 5/5  Right peroneal: 5/5  Left peroneal: 5/5  Right gastroc: 5/5  Left gastroc: 5/5    Sensory Exam   Light touch normal.   Right arm light touch: normal  Left arm light  touch: normal  Right leg light touch: normal  Left leg light touch: normal  Vibration normal.   Right arm vibration: normal  Left arm vibration: normal  Right leg vibration: normal  Left leg vibration: normal  Proprioception normal.   Right arm proprioception: normal  Left arm proprioception: normal  Right leg proprioception: normal  Left leg proprioception: normal  Pinprick normal.   Right arm pinprick: normal  Left arm pinprick: normal  Right leg pinprick: normal  Left leg pinprick: normal  Graphesthesia: normal  Stereognosis: normal    Gait, Coordination, and Reflexes     Gait  Gait: normal    Coordination   Romberg: negative  Finger to nose coordination: normal  Heel to shin coordination: normal  Tandem walking coordination: normal    Tremor   Resting tremor: absent  Intention tremor: absent  Action tremor: absent    Reflexes   Right brachioradialis: 2+  Left brachioradialis: 2+  Right biceps: 2+  Left biceps: 2+  Right triceps: 2+  Left triceps: 2+  Right patellar: 2+  Left patellar: 2+  Right achilles: 2+  Left achilles: 2+  Right plantar: normal  Left plantar: normal  Right Kumari: absent  Left Kumari: absent  Right ankle clonus: absent  Left ankle clonus: absent  Right pendular knee jerk: absent  Left pendular knee jerk: absent          Lab Results   Component Value Date    WBC 8.13 10/16/2023    HGB 13.4 10/16/2023    HCT 39.9 10/16/2023    MCV 91 10/16/2023     10/16/2023     Sodium   Date Value Ref Range Status   02/27/2023 140 136 - 145 mmol/L Final     Potassium   Date Value Ref Range Status   02/27/2023 3.5 3.5 - 5.1 mmol/L Final     Chloride   Date Value Ref Range Status   02/27/2023 107 95 - 110 mmol/L Final     CO2   Date Value Ref Range Status   02/27/2023 19 (L) 23 - 29 mmol/L Final     Glucose   Date Value Ref Range Status   02/27/2023 83 70 - 110 mg/dL Final     BUN   Date Value Ref Range Status   02/27/2023 9 6 - 20 mg/dL Final     Creatinine   Date Value Ref Range Status   02/27/2023  0.8 0.5 - 1.4 mg/dL Final     Calcium   Date Value Ref Range Status   02/27/2023 8.8 8.7 - 10.5 mg/dL Final     Total Protein   Date Value Ref Range Status   02/27/2023 6.7 6.0 - 8.4 g/dL Final     Albumin   Date Value Ref Range Status   02/27/2023 3.4 (L) 3.5 - 5.2 g/dL Final     Total Bilirubin   Date Value Ref Range Status   02/27/2023 0.6 0.1 - 1.0 mg/dL Final     Comment:     For infants and newborns, interpretation of results should be based  on gestational age, weight and in agreement with clinical  observations.    Premature Infant recommended reference ranges:  Up to 24 hours.............<8.0 mg/dL  Up to 48 hours............<12.0 mg/dL  3-5 days..................<15.0 mg/dL  6-29 days.................<15.0 mg/dL       Alkaline Phosphatase   Date Value Ref Range Status   02/27/2023 49 (L) 55 - 135 U/L Final     AST   Date Value Ref Range Status   02/27/2023 10 10 - 40 U/L Final     ALT   Date Value Ref Range Status   02/27/2023 11 10 - 44 U/L Final     Anion Gap   Date Value Ref Range Status   02/27/2023 14 8 - 16 mmol/L Final     eGFR if    Date Value Ref Range Status   09/08/2020 >60.0 >60 mL/min/1.73 m^2 Final     eGFR if non    Date Value Ref Range Status   09/08/2020 >60.0 >60 mL/min/1.73 m^2 Final     Comment:     Calculation used to obtain the estimated glomerular filtration  rate (eGFR) is the CKD-EPI equation.        Lab Results   Component Value Date    OBYISQGY93 696 10/22/2015     Lab Results   Component Value Date    TSH 2.21 12/06/2021    FREET4 0.78 08/07/2015         RADIOLOGY EVALUATION       2017 RECORDS WORKUP    Brain MRI MRA NL    CSF OP 20 cm H2O???        02-     Brain MRI WWO NL     Brain MRV  NL      02-    Overall the LP is normal with acceptable opening pressure and normal CSF.     In her case I would consider any opening pressure <27.5 normal (her opening pressure was 26).      03-    CTA H/N NL    Reviewed the neuroimaging  independently       Assessment:       1. Awareness alteration, transient    2. Tension headache    3. Obesity (BMI 35.0-39.9 without comorbidity)    4. Pseudotumor cerebri    5. History of sleeve gastrectomy    6. Migraine without aura and without status migrainosus, not intractable    7. Gastroesophageal reflux disease, unspecified whether esophagitis present    8. Chronic fatigue    9. Other symptoms and signs involving the nervous system        Plan:           S/P TUBAL LIGATION       CHRONIC MIXED HEADACHES: MIGRAINE WITH TENSION     UNSUBSTANTIATED INTRACRANIAL HYPERTENSION WITH NORMAL OPENING PRESSURE AND PSEUDOPAPILLEDEMA          The patient does not want to revisit the diagnosis via LP.    Continue Nurtec 75 mg ODT PRN.     Continue Methazolamide 50 mg PO BID.    Could not tolerate Diamox and TPM.        SPELLS OF LOSS OF AWARENESS 01-, 02-      EEG and AEEG.    CTA H/N.    Cardiology evaluation.                   MEDICAL/SURGICAL COMORBIDITIES     All relevant medical comorbidities noted and managed by primary care physician and medical care team.        HEALTHY LIFESTYLE AND PREVENTATIVE CARE     Encouraged the patient to adhere to the age-appropriate health maintenance guidelines including screening tests and vaccinations.      Discussed the overall importance of healthy lifestyle, optimal weight, exercise, healthy diet, good sleep hygiene and avoiding drugs including smoking, alcohol and recreational drugs. The patient verbalized full understanding.         Shahla Harry MD, FAAN    Attending Neurologist/Epileptologist         Diplomate, American Board of Psychiatry and Neurology     Diplomate, American Board of Clinical Neurophysiology     Fellow, American Academy of Neurology         I spent a total of 40 minutes on the day of the visit.  This includes face to face time and non-face to face time preparing to see the patient (eg, review of tests), obtaining and/or reviewing separately  obtained history, documenting clinical information in the electronic or other health record, independently interpreting results and communicating results to the patient/family/caregiver, or care coordinator.

## 2024-02-27 ENCOUNTER — TELEPHONE (OUTPATIENT)
Dept: CARDIOLOGY | Facility: CLINIC | Age: 39
End: 2024-02-27
Payer: COMMERCIAL

## 2024-02-27 NOTE — TELEPHONE ENCOUNTER
Contacted patient to reschedule New Patient appointment with Cardiologist. Patient states she does not wish to schedule at this time. Patient states she will call and schedule when she is ready.

## 2024-03-06 DIAGNOSIS — Z12.31 OTHER SCREENING MAMMOGRAM: ICD-10-CM

## 2024-03-13 ENCOUNTER — HOSPITAL ENCOUNTER (OUTPATIENT)
Dept: RADIOLOGY | Facility: HOSPITAL | Age: 39
Discharge: HOME | End: 2024-03-13
Attending: PSYCHIATRY & NEUROLOGY
Payer: COMMERCIAL

## 2024-03-13 DIAGNOSIS — R29.818 OTHER SYMPTOMS AND SIGNS INVOLVING THE NERVOUS SYSTEM: ICD-10-CM

## 2024-03-13 PROCEDURE — 25500020 PHARM REV CODE 255: Performed by: PSYCHIATRY & NEUROLOGY

## 2024-03-13 PROCEDURE — 70496 CT ANGIOGRAPHY HEAD: CPT | Mod: TC

## 2024-03-13 PROCEDURE — 70498 CT ANGIOGRAPHY NECK: CPT | Mod: TC

## 2024-03-13 PROCEDURE — 70496 CT ANGIOGRAPHY HEAD: CPT | Mod: 26,,, | Performed by: RADIOLOGY

## 2024-03-13 PROCEDURE — 70498 CT ANGIOGRAPHY NECK: CPT | Mod: 26,,, | Performed by: RADIOLOGY

## 2024-03-13 RX ADMIN — IOHEXOL 100 ML: 350 INJECTION, SOLUTION INTRAVENOUS at 11:03

## 2024-04-25 DIAGNOSIS — G43.009 MIGRAINE WITHOUT AURA AND WITHOUT STATUS MIGRAINOSUS, NOT INTRACTABLE: ICD-10-CM

## 2024-04-25 NOTE — TELEPHONE ENCOUNTER
----- Message from La Nena Christine sent at 4/25/2024  1:37 PM CDT -----  Contact: Shruti  .Type:  RX Refill Request    Who Called: Shruti   Refill or New Rx:Refill   RX Name and Strength:rimegepant 75 mg odt   How is the patient currently taking it? (ex. 1XDay):as prescribed    Is this a 30 day or 90 day RX:30 days   Preferred Pharmacy with phone number:..  [id number] - [place], [place] - [address] [address]  [place] [address]  Phone: [phone] Fax: [phone]  Local or Mail Order:local   Ordering Provider:Dr. Harry  Would the patient rather a call back or a response via [facility]? Call   Best Call Back Number:..[phone]   Additional Information: Pt requesting medication. Has been waiting for 3 weeks

## 2024-08-01 ENCOUNTER — HOSPITAL ENCOUNTER (OUTPATIENT)
Dept: RADIOLOGY | Facility: HOSPITAL | Age: 39
Discharge: HOME | End: 2024-08-01
Attending: INTERNAL MEDICINE
Payer: COMMERCIAL

## 2024-08-01 DIAGNOSIS — R10.84 ABDOMINAL PAIN, DIFFUSE: ICD-10-CM

## 2024-08-01 DIAGNOSIS — R10.30 ABDOMINAL PAIN, LOWER: ICD-10-CM

## 2024-08-01 DIAGNOSIS — R19.4 CHANGE IN BOWEL HABITS: ICD-10-CM

## 2024-08-01 DIAGNOSIS — R10.12 LUQ PAIN: ICD-10-CM

## 2024-08-01 PROCEDURE — 74178 CT ABD&PLV WO CNTR FLWD CNTR: CPT | Mod: 26,,, | Performed by: STUDENT IN AN ORGANIZED HEALTH CARE EDUCATION/TRAINING PROGRAM

## 2024-08-01 PROCEDURE — A9698 NON-RAD CONTRAST MATERIALNOC: HCPCS | Mod: PO

## 2024-08-01 PROCEDURE — 74178 CT ABD&PLV WO CNTR FLWD CNTR: CPT | Mod: TC,PO

## 2024-08-01 PROCEDURE — 25500020 PHARM REV CODE 255: Mod: PO

## 2024-08-01 RX ADMIN — IOHEXOL 75 ML: 350 INJECTION, SOLUTION INTRAVENOUS at 04:08

## 2024-08-01 RX ADMIN — IOHEXOL 1000 ML: 12 SOLUTION ORAL at 04:08

## 2024-10-01 ENCOUNTER — HOSPITAL ENCOUNTER (OUTPATIENT)
Dept: CARDIOLOGY | Facility: HOSPITAL | Age: 39
Discharge: HOME | End: 2024-10-01
Payer: COMMERCIAL

## 2024-10-01 ENCOUNTER — OFFICE VISIT (OUTPATIENT)
Dept: INTERNAL MEDICINE | Facility: CLINIC | Age: 39
End: 2024-10-01
Payer: COMMERCIAL

## 2024-10-01 VITALS
BODY MASS INDEX: 41.91 KG/M2 | TEMPERATURE: 98 F | SYSTOLIC BLOOD PRESSURE: 142 MMHG | HEART RATE: 108 BPM | OXYGEN SATURATION: 97 % | WEIGHT: 236.56 LBS | HEIGHT: 63 IN | RESPIRATION RATE: 18 BRPM | DIASTOLIC BLOOD PRESSURE: 90 MMHG

## 2024-10-01 DIAGNOSIS — Z13.29 THYROID DISORDER SCREEN: ICD-10-CM

## 2024-10-01 DIAGNOSIS — R07.9 CHEST PAIN, UNSPECIFIED TYPE: ICD-10-CM

## 2024-10-01 DIAGNOSIS — Z13.220 LIPID SCREENING: ICD-10-CM

## 2024-10-01 DIAGNOSIS — E66.01 OBESITY, MORBID, BMI 40.0-49.9: ICD-10-CM

## 2024-10-01 DIAGNOSIS — Z00.00 ROUTINE ADULT HEALTH MAINTENANCE: Primary | ICD-10-CM

## 2024-10-01 DIAGNOSIS — I10 HYPERTENSION, UNSPECIFIED TYPE: ICD-10-CM

## 2024-10-01 DIAGNOSIS — G93.2 PSEUDOTUMOR CEREBRI: ICD-10-CM

## 2024-10-01 DIAGNOSIS — Z13.1 DIABETES MELLITUS SCREENING: ICD-10-CM

## 2024-10-01 DIAGNOSIS — G43.009 MIGRAINE WITHOUT AURA AND WITHOUT STATUS MIGRAINOSUS, NOT INTRACTABLE: ICD-10-CM

## 2024-10-01 LAB
OHS QRS DURATION: 68 MS
OHS QTC CALCULATION: 435 MS

## 2024-10-01 PROCEDURE — 93005 ELECTROCARDIOGRAM TRACING: CPT | Mod: PO

## 2024-10-01 PROCEDURE — 93010 ELECTROCARDIOGRAM REPORT: CPT | Mod: ,,, | Performed by: INTERNAL MEDICINE

## 2024-10-01 PROCEDURE — 99999 PR PBB SHADOW E&M-EST. PATIENT-LVL IV: CPT | Mod: PBBFAC,,, | Performed by: NURSE PRACTITIONER

## 2024-10-01 PROCEDURE — 99396 PREV VISIT EST AGE 40-64: CPT | Mod: S$GLB,,, | Performed by: NURSE PRACTITIONER

## 2024-10-01 RX ORDER — DICYCLOMINE HYDROCHLORIDE 20 MG/1
20 TABLET ORAL 4 TIMES DAILY PRN
COMMUNITY
Start: 2024-07-11

## 2024-10-01 RX ORDER — AMLODIPINE BESYLATE 5 MG/1
5 TABLET ORAL DAILY
Qty: 30 TABLET | Refills: 0 | Status: SHIPPED | OUTPATIENT
Start: 2024-10-01

## 2024-10-01 NOTE — PROGRESS NOTES
Subjective:       Patient ID: Shruti Mercado is a 39 y.o. female.    Chief Complaint: Hypertension (Patient states the last few times she has gone to the doctor her blood pressure has been up. Also c/o chest pains )      History of Present Illness    CHIEF COMPLAINT:  - The patient presents for an annual wellness visit with concerns about consistently elevated blood pressure readings at recent doctor appointments.    HPI:  Ms. Mercado reports a history of elevated blood pressure readings at multiple doctor visits over the past year, consistently exceeding 140 systolic. At a previous appointment, both numbers were significantly elevated, with the systolic reaching 190. Today's readings were 138/90 and 142/90.    The patient reports intermittent chest pain, with at least 2 episodes in the past week, typically lasting a few minutes. One episode 2 nights ago was severe enough to disrupt sleep. The pain is described as sharp, located in the left midsternal area, occurring suddenly and not associated with lying down.    The patient has a history of diverticulosis and diverticulitis. She recently had an upper GI endoscopy due to abdominal pain. Dr. Tolbert prescribed dicyclomine to help relax her intestines and muscles, which has provided minimal relief.    The patient has pseudotumor cerebri, for which she takes methazolamide. She has an upcoming annual checkup with her eye doctor on October 31st.    The patient had a similar episode of chest pain in July 2016, for which she saw a cardiologist, but does not recall the details of that visit or any tests performed.    Regarding exercise, the patient walks and uses a ski aerobic machine about 3 times per week.       Dental exam up to date.   Mammo scheduled for December with well woman.     The patient denies fever, chills, body aches, fatigue, appetite changes, nausea, vomiting, diarrhea, constipation, blood in stool, urinary pain, or urinary frequency. She also denies  any vision problems outside of the pressure related to her pseudotumor condition.    SURGICAL HISTORY:  - Upper GI endoscopy: 2 weeks ago  - Colonoscopy: April last year          Patient Active Problem List   Diagnosis    GERD (gastroesophageal reflux disease)    Pseudotumor cerebri    Migraine without aura and without status migrainosus, not intractable    Obesity, morbid, BMI 40.0-49.9    Chronic fatigue    History of sleeve gastrectomy    Tension headache    Awareness alteration, transient    Hypertension         Past Surgical History:   Procedure Laterality Date    ABSCESS DRAINAGE      ENDOMETRIAL ABLATION  02/22/2019    HIP SURGERY Right     LAPAROSCOPIC TUBAL LIGATION W/ ANTOINETTE CLIPS  2008    OVARIAN CYST SURGERY      sleeve      TUBAL LIGATION           Current Outpatient Medications:     dicyclomine (BENTYL) 20 mg tablet, Take 20 mg by mouth 4 (four) times daily as needed., Disp: , Rfl:     meloxicam (MOBIC) 15 MG tablet, Take 1 tablet by mouth once daily, Disp: 30 tablet, Rfl: 0    methazoLAMIDE (NEPTAZANE) 50 MG Tab, Take 1 tablet (50 mg total) by mouth 2 (two) times daily., Disp: 60 tablet, Rfl: 11    multivitamin capsule, Take 1 capsule by mouth once daily., Disp: , Rfl:     rimegepant 75 mg odt, Take 1 tablet (75 mg total) by mouth as needed for Migraine (max dose 3 doses within 1 week). Place ODT tablet on the tongue; alternatively the ODT tablet may be placed under the tongue, Disp: 16 tablet, Rfl: 3    amLODIPine (NORVASC) 5 MG tablet, Take 1 tablet (5 mg total) by mouth once daily., Disp: 30 tablet, Rfl: 0    Review of Systems   Constitutional:  Negative for activity change, appetite change, chills, diaphoresis, fatigue and fever.   Eyes:  Negative for visual disturbance.   Respiratory:  Negative for cough and shortness of breath.    Cardiovascular:  Positive for chest pain (intermittent, 1-2 times per week, minutes,).   Gastrointestinal:  Positive for abdominal pain. Negative for blood in stool,  "constipation, diarrhea, nausea and vomiting.   Endocrine: Negative for polydipsia, polyphagia and polyuria.   Genitourinary:  Negative for dysuria, flank pain, frequency and urgency.   Neurological:  Negative for dizziness, syncope, facial asymmetry, light-headedness and numbness.       Objective:   BP (!) 142/90   Pulse 108   Temp 98.4 °F (36.9 °C) (Oral)   Resp 18   Ht 5' 3" (1.6 m)   Wt 107.3 kg (236 lb 8.9 oz)   SpO2 97%   BMI 41.90 kg/m²      Physical Exam  Vitals reviewed.   Constitutional:       General: She is not in acute distress.     Appearance: Normal appearance. She is well-developed. She is not ill-appearing or diaphoretic.   HENT:      Head: Normocephalic.      Right Ear: Tympanic membrane normal.      Left Ear: Tympanic membrane normal.      Nose: Nose normal. No congestion or rhinorrhea.      Mouth/Throat:      Mouth: Mucous membranes are moist.      Pharynx: Oropharynx is clear. No oropharyngeal exudate or posterior oropharyngeal erythema.   Eyes:      General:         Right eye: No discharge.         Left eye: No discharge.      Extraocular Movements: Extraocular movements intact.      Conjunctiva/sclera: Conjunctivae normal.      Pupils: Pupils are equal, round, and reactive to light.   Cardiovascular:      Rate and Rhythm: Normal rate and regular rhythm.      Pulses: Normal pulses.      Heart sounds: Normal heart sounds. No murmur heard.     No friction rub. No gallop.   Pulmonary:      Effort: Pulmonary effort is normal. No respiratory distress.      Breath sounds: Normal breath sounds. No rales.   Abdominal:      Palpations: Abdomen is soft.      Tenderness: There is no abdominal tenderness. There is no guarding.   Musculoskeletal:      Cervical back: Neck supple. No tenderness.   Lymphadenopathy:      Cervical: No cervical adenopathy.   Skin:     General: Skin is warm and dry.      Coloration: Skin is not pale.      Findings: No erythema.   Neurological:      Mental Status: She is " alert and oriented to person, place, and time.   Psychiatric:         Mood and Affect: Mood normal.         Behavior: Behavior normal.         Assessment & Plan     Problem List Items Addressed This Visit          Neuro    Pseudotumor cerebri    Current Assessment & Plan     On methazolamide. Followed by neuro.          Migraine without aura and without status migrainosus, not intractable    Current Assessment & Plan     Followed by neuro. Nurtec prn.             Cardiac/Vascular    Hypertension    Relevant Medications    amLODIPine (NORVASC) 5 MG tablet       Endocrine    Obesity, morbid, BMI 40.0-49.9    Current Assessment & Plan     Counseled on importance of diet and exercise in order to improve overall quality of life, and reduce risk of future comorbidities.            Other Visit Diagnoses       Routine adult health maintenance    -  Primary    Relevant Orders    CBC Auto Differential (Completed)    COMPREHENSIVE METABOLIC PANEL (Completed)    Lipid Panel (Completed)    TSH (Completed)    Hemoglobin A1C (Completed)    Chest pain, unspecified type        Relevant Orders    EKG 12-lead (Completed)    Ambulatory referral/consult to Cardiology    Thyroid disorder screen        Relevant Orders    TSH (Completed)    Diabetes mellitus screening        Relevant Orders    Hemoglobin A1C (Completed)    Lipid screening        Relevant Orders    Lipid Panel (Completed)            Assessment & Plan    MEDICAL DECISION MAKING:  - Assessed patient with hypertension based on multiple elevated blood pressure readings  - Evaluated need for cardiac workup given history of chest pain in 2016 and recent upper GI findings  - Determined need for thyroid screening and diabetes screening as potential contributors to hypertension  - Assessed risk factors for uncontrolled hypertension, including potential for vision problems, kidney disease, stroke, and heart attack    PATIENT EDUCATION:  - Explained hypertension diagnosis criteria and  "risks of uncontrolled hypertension  - Discussed importance of diet and exercise in managing blood pressure  - Educated on potential side effects of amlodipine, including dizziness, lightheadedness, fatigue, and occasional leg swelling  - Explained blood pressure goal of less than 140/90    ACTION ITEMS/LIFESTYLE:  - Ms. Mercado to monitor blood pressure at home  - Recommend increasing exercise regimen to include weight-bearing exercises  - Recommend reducing carbohydrate intake (pasta, bread)  - Recommend following high protein, low fat, low carb diet    MEDICATIONS:  - Started amlodipine 5 mg daily for hypertension    ORDERS:  - EKG ordered  - Labs ordered: thyroid screening, diabetes screening , cholesterol panel, anemia levels, electrolytes    REFERRALS:  - Referred to Cardiology for evaluation    FOLLOW UP:  - Follow up in 2 weeks for nurse visit to check blood pressure and medication tolerance  - Contact office if experiencing low blood pressure, dizziness, or lightheadedness before scheduled follow-up  - Provider will notify patient of any concerning lab results by tomorrow or within a few days          Follow up in about 2 weeks (around 10/15/2024), or if symptoms worsen or fail to improve, for hypertension.          Portions of this note may have been created with voice recognition software. Occasional "wrong-word" or "sound-a-like" substitutions may have occurred due to the inherent limitations of voice recognition software. Please, read the note carefully and recognize, using context, where substitutions have occurred.       This note was generated with the assistance of ambient listening technology. Verbal consent was obtained by the patient and accompanying visitor(s) for the recording of patient appointment to facilitate this note. I attest to having reviewed and edited the generated note for accuracy, though some syntax or spelling errors may persist. Please contact the author of this note for any " clarification.

## 2024-10-04 PROBLEM — I10 HYPERTENSION: Status: ACTIVE | Noted: 2024-10-04

## 2024-10-21 DIAGNOSIS — G43.009 MIGRAINE WITHOUT AURA AND WITHOUT STATUS MIGRAINOSUS, NOT INTRACTABLE: ICD-10-CM

## 2024-10-21 RX ORDER — RIMEGEPANT SULFATE 75 MG/75MG
TABLET, ORALLY DISINTEGRATING ORAL
Qty: 8 TABLET | Refills: 3 | Status: SHIPPED | OUTPATIENT
Start: 2024-10-21

## 2024-10-31 ENCOUNTER — OFFICE VISIT (OUTPATIENT)
Dept: OPHTHALMOLOGY | Facility: CLINIC | Age: 39
End: 2024-10-31
Payer: COMMERCIAL

## 2024-10-31 DIAGNOSIS — G93.2 PSEUDOTUMOR CEREBRI: Primary | ICD-10-CM

## 2024-10-31 DIAGNOSIS — H40.013 OPEN ANGLE WITH BORDERLINE FINDINGS OF BOTH EYES: ICD-10-CM

## 2024-10-31 DIAGNOSIS — H52.13 MYOPIA WITH ASTIGMATISM AND PRESBYOPIA, BILATERAL: ICD-10-CM

## 2024-10-31 DIAGNOSIS — H52.203 MYOPIA WITH ASTIGMATISM AND PRESBYOPIA, BILATERAL: ICD-10-CM

## 2024-10-31 DIAGNOSIS — H52.4 MYOPIA WITH ASTIGMATISM AND PRESBYOPIA, BILATERAL: ICD-10-CM

## 2024-10-31 PROCEDURE — 92014 COMPRE OPH EXAM EST PT 1/>: CPT | Mod: S$GLB,,, | Performed by: OPTOMETRIST

## 2024-10-31 PROCEDURE — 92133 CPTRZD OPH DX IMG PST SGM ON: CPT | Mod: S$GLB,,, | Performed by: OPTOMETRIST

## 2024-10-31 PROCEDURE — 92015 DETERMINE REFRACTIVE STATE: CPT | Mod: S$GLB,,, | Performed by: OPTOMETRIST

## 2024-10-31 PROCEDURE — 99999 PR PBB SHADOW E&M-EST. PATIENT-LVL II: CPT | Mod: PBBFAC,,, | Performed by: OPTOMETRIST

## 2024-10-31 RX ORDER — NEOMYCIN SULFATE, POLYMYXIN B SULFATE AND DEXAMETHASONE 3.5; 10000; 1 MG/ML; [USP'U]/ML; MG/ML
1 SUSPENSION/ DROPS OPHTHALMIC 4 TIMES DAILY
Qty: 5 ML | Refills: 0 | Status: SHIPPED | OUTPATIENT
Start: 2024-10-31 | End: 2024-11-07

## 2024-11-13 ENCOUNTER — OFFICE VISIT (OUTPATIENT)
Dept: CARDIOLOGY | Facility: CLINIC | Age: 39
End: 2024-11-13
Payer: COMMERCIAL

## 2024-11-13 VITALS
BODY MASS INDEX: 41.76 KG/M2 | WEIGHT: 235.69 LBS | HEIGHT: 63 IN | DIASTOLIC BLOOD PRESSURE: 90 MMHG | OXYGEN SATURATION: 97 % | HEART RATE: 80 BPM | SYSTOLIC BLOOD PRESSURE: 130 MMHG

## 2024-11-13 DIAGNOSIS — I35.8 AORTIC HEART MURMUR: Primary | ICD-10-CM

## 2024-11-13 DIAGNOSIS — R07.9 CHEST PAIN, UNSPECIFIED TYPE: ICD-10-CM

## 2024-11-13 DIAGNOSIS — I10 HYPERTENSION, UNSPECIFIED TYPE: ICD-10-CM

## 2024-11-13 DIAGNOSIS — Z91.199 POOR COMPLIANCE: ICD-10-CM

## 2024-11-13 PROCEDURE — 99999 PR PBB SHADOW E&M-EST. PATIENT-LVL III: CPT | Mod: PBBFAC,,, | Performed by: INTERNAL MEDICINE

## 2024-11-13 PROCEDURE — 99204 OFFICE O/P NEW MOD 45 MIN: CPT | Mod: S$GLB,,, | Performed by: INTERNAL MEDICINE

## 2024-11-13 RX ORDER — HYDROCHLOROTHIAZIDE 12.5 MG/1
12.5 TABLET ORAL DAILY
Qty: 30 TABLET | Refills: 11 | Status: SHIPPED | OUTPATIENT
Start: 2024-11-13 | End: 2025-11-13

## 2024-11-13 RX ORDER — AMLODIPINE BESYLATE 5 MG/1
5 TABLET ORAL DAILY
Qty: 30 TABLET | Refills: 11 | Status: SHIPPED | OUTPATIENT
Start: 2024-11-13

## 2024-11-13 NOTE — PROGRESS NOTES
Subjective:   Patient ID:  Shruti Mercado is a 39 y.o. female who presents for evaluation of No chief complaint on file.      HPI  11.13.2024  40 yo female, never smoker, no vaping   Father had a heart attack in his 60's but was smoker  1 month of chest pain , tightness with dyspnea, states mainly when she is emotional , mid sternal   Supposed to be on amlodipine but she is afraid starting on it   States more often , couple times a week   She denies a known anxiety problem     Past Medical History:   Diagnosis Date    Migraines     Morbid obesity     Pseudotumor cerebri        Past Surgical History:   Procedure Laterality Date    ABSCESS DRAINAGE      ENDOMETRIAL ABLATION  02/22/2019    HIP SURGERY Right     LAPAROSCOPIC TUBAL LIGATION W/ ANTOINETTE CLIPS  2008    OVARIAN CYST SURGERY      sleeve      TUBAL LIGATION         Social History     Tobacco Use    Smoking status: Never    Smokeless tobacco: Never   Substance Use Topics    Alcohol use: No    Drug use: No       Family History   Problem Relation Name Age of Onset    Hypertension Mother      Hypertension Father      Diabetes Father      Cancer Sister      No Known Problems Daughter      No Known Problems Son      No Known Problems Sister      No Known Problems Sister      No Known Problems Sister      Cancer Paternal Grandfather      Stroke Paternal Grandfather         Review of Systems   Cardiovascular:  Negative for chest pain, dyspnea on exertion, palpitations and syncope.   Genitourinary: Negative.    Neurological: Negative.        Current Outpatient Medications on File Prior to Visit   Medication Sig    dicyclomine (BENTYL) 20 mg tablet Take 20 mg by mouth 4 (four) times daily as needed.    meloxicam (MOBIC) 15 MG tablet Take 1 tablet by mouth once daily    methazoLAMIDE (NEPTAZANE) 50 MG Tab Take 1 tablet (50 mg total) by mouth 2 (two) times daily.    multivitamin capsule Take 1 capsule by mouth once daily.    rimegepant (NURTEC) 75 mg odt TAKE 1 TABLET  BY MOUTH AS NEEDED FOR MIGRAINE. MAX 3 DOSES PER WEEK. CAN BE PLACED ON OR UNDER TONGUE    [DISCONTINUED] amLODIPine (NORVASC) 5 MG tablet Take 1 tablet (5 mg total) by mouth once daily.     No current facility-administered medications on file prior to visit.       Objective:   Objective:  Wt Readings from Last 3 Encounters:   11/13/24 106.9 kg (235 lb 10.8 oz)   10/01/24 107.3 kg (236 lb 8.9 oz)   10/30/23 107 kg (235 lb 14.3 oz)     Temp Readings from Last 3 Encounters:   10/01/24 98.4 °F (36.9 °C) (Oral)   05/24/23 97.7 °F (36.5 °C)   02/27/23 98.6 °F (37 °C) (Oral)     BP Readings from Last 3 Encounters:   11/13/24 (!) 130/90   10/01/24 (!) 142/90   10/30/23 135/89     Pulse Readings from Last 3 Encounters:   11/13/24 80   10/01/24 108   10/30/23 84       Physical Exam  Vitals reviewed.   Constitutional:       Appearance: She is well-developed.   Neck:      Vascular: No carotid bruit.   Cardiovascular:      Rate and Rhythm: Normal rate and regular rhythm.      Pulses: Intact distal pulses.      Heart sounds: Normal heart sounds. No murmur heard.  Pulmonary:      Breath sounds: Normal breath sounds.   Neurological:      Mental Status: She is oriented to person, place, and time.         Lab Results   Component Value Date    CHOL 207 (H) 10/01/2024    CHOL 226 (H) 10/06/2022    CHOL 206 (H) 09/08/2020     Lab Results   Component Value Date    HDL 47 10/01/2024    HDL 50 10/06/2022    HDL 51 09/08/2020     Lab Results   Component Value Date    LDLCALC 111.8 10/01/2024    LDLCALC 155.2 10/06/2022    LDLCALC 128.2 09/08/2020     Lab Results   Component Value Date    TRIG 241 (H) 10/01/2024    TRIG 104 10/06/2022    TRIG 134 09/08/2020     Lab Results   Component Value Date    CHOLHDL 22.7 10/01/2024    CHOLHDL 22.1 10/06/2022    CHOLHDL 24.8 09/08/2020       Chemistry        Component Value Date/Time     10/01/2024 1400    K 3.8 10/01/2024 1400     10/01/2024 1400    CO2 26 10/01/2024 1400    BUN 12  "10/01/2024 1400    CREATININE 0.8 10/01/2024 1400     (H) 10/01/2024 1400        Component Value Date/Time    CALCIUM 9.4 10/01/2024 1400    ALKPHOS 57 10/01/2024 1400    AST 17 10/01/2024 1400    ALT 17 10/01/2024 1400    BILITOT 0.3 10/01/2024 1400    ESTGFRAFRICA >60.0 09/08/2020 1431    EGFRNONAA >60.0 09/08/2020 1431          Lab Results   Component Value Date    TSH 1.444 10/01/2024     No results found for: "INR", "PROTIME"  Lab Results   Component Value Date    WBC 8.21 10/01/2024    HGB 13.1 10/01/2024    HCT 39.3 10/01/2024    MCV 94 10/01/2024     10/01/2024     BNP  @LABRCNTIP(BNP,BNPTRIAGEBLO)@  CrCl cannot be calculated (Patient's most recent lab result is older than the maximum 7 days allowed.).     Imaging:  ======    No results found for this or any previous visit.    No results found for this or any previous visit.    Results for orders placed during the hospital encounter of 03/17/15    X-Ray Chest PA And Lateral    Narrative  2 view chest    Comparison: None    Findings: The lungs are clear and free of infiltrate.  No pleural effusion or pneumothorax is identified.  The heart is not enlarged.  IMPRESSION:      1.  No acute cardiopulmonary process.      Electronically signed by: GILDARDO PELLETIER D.O.  Date:     03/17/15  Time:    16:38    No results found for this or any previous visit.    No valid procedures specified.    No results found for this or any previous visit.      No results found for this or any previous visit.      No results found for this or any previous visit.      Diagnostic Results:  ECG: Reviewed    The 10-year ASCVD risk score (Sahara DK, et al., 2019) is: 1.2%    Values used to calculate the score:      Age: 39 years      Sex: Female      Is Non- : No      Diabetic: No      Tobacco smoker: No      Systolic Blood Pressure: 130 mmHg      Is BP treated: Yes      HDL Cholesterol: 47 mg/dL      Total Cholesterol: 207 mg/dL        Assessment " and Plan:   Aortic heart murmur  -     Echo; Future    Chest pain, unspecified type  -     Ambulatory referral/consult to Cardiology  -     Exercise Stress - EKG; Future  -     Echo; Future    Hypertension, unspecified type  -     hydroCHLOROthiazide (HYDRODIURIL) 12.5 MG Tab; Take 1 tablet (12.5 mg total) by mouth once daily.  Dispense: 30 tablet; Refill: 11  -     amLODIPine (NORVASC) 5 MG tablet; Take 1 tablet (5 mg total) by mouth once daily.  Dispense: 30 tablet; Refill: 11    Poor compliance      Hesitant bout starting anti htn   Repeat bp 145/95   Recommend starting norvasc and hctz  Reviewed all tests and above medical conditions with patient in detail and formulated treatment plan.  Risk factor modification discussed.   Cardiac low salt diet discussed.  Maintaining healthy weight and weight loss goals were discussed in clinic.  AORTIC murmur , states she was told in the past she had a trace of murmur ?  Follow up in  6 months

## 2024-11-17 ENCOUNTER — PATIENT MESSAGE (OUTPATIENT)
Dept: OPTOMETRY | Facility: CLINIC | Age: 39
End: 2024-11-17
Payer: COMMERCIAL

## 2024-11-26 ENCOUNTER — OFFICE VISIT (OUTPATIENT)
Dept: OPHTHALMOLOGY | Facility: CLINIC | Age: 39
End: 2024-11-26
Payer: COMMERCIAL

## 2024-11-26 DIAGNOSIS — G93.2 PSEUDOTUMOR CEREBRI: Primary | ICD-10-CM

## 2024-11-26 DIAGNOSIS — H40.013 OPEN ANGLE WITH BORDERLINE FINDINGS OF BOTH EYES: ICD-10-CM

## 2024-11-26 PROCEDURE — 92014 COMPRE OPH EXAM EST PT 1/>: CPT | Mod: S$GLB,,, | Performed by: OPTOMETRIST

## 2024-11-26 PROCEDURE — 92083 EXTENDED VISUAL FIELD XM: CPT | Mod: S$GLB,,, | Performed by: OPTOMETRIST

## 2024-11-26 PROCEDURE — 99999 PR PBB SHADOW E&M-EST. PATIENT-LVL III: CPT | Mod: PBBFAC,,, | Performed by: OPTOMETRIST

## 2024-11-26 NOTE — PROGRESS NOTES
HPI     Glaucoma            Comments: 24-2VF, pach and dilated exam. No pain or discomfort. Not using   any drops.           Comments    Lumbar puncture 2017, 2019, 2023  Sees Dr Harry neuro          Last edited by Joan Chaudhary on 11/26/2024  8:32 AM.            Assessment /Plan     For exam results, see Encounter Report.    Pseudotumor cerebri  -     Vincent Visual Field - OU - Extended - Both Eyes    Open angle with borderline findings of both eyes  -     Vincent Visual Field - OU - Extended - Both Eyes    Normal VF today OD, OS  Overall stable appearance compared to 2022   Slight progression on disc contour OCT OS, but stable va and VF   Monitor 6 months        RTC 6 months for IOP check, gOCT and dOCT or PRN  Discussed above and all questions were answered.

## 2024-11-27 ENCOUNTER — HOSPITAL ENCOUNTER (OUTPATIENT)
Dept: CARDIOLOGY | Facility: HOSPITAL | Age: 39
Discharge: HOME | End: 2024-11-27
Attending: INTERNAL MEDICINE
Payer: COMMERCIAL

## 2024-11-27 VITALS
DIASTOLIC BLOOD PRESSURE: 90 MMHG | SYSTOLIC BLOOD PRESSURE: 130 MMHG | WEIGHT: 235 LBS | BODY MASS INDEX: 41.64 KG/M2 | HEIGHT: 63 IN

## 2024-11-27 DIAGNOSIS — R07.9 CHEST PAIN, UNSPECIFIED TYPE: ICD-10-CM

## 2024-11-27 DIAGNOSIS — I35.8 AORTIC HEART MURMUR: ICD-10-CM

## 2024-11-27 PROCEDURE — 93018 CV STRESS TEST I&R ONLY: CPT | Mod: ,,, | Performed by: INTERNAL MEDICINE

## 2024-11-27 PROCEDURE — 93016 CV STRESS TEST SUPVJ ONLY: CPT | Mod: ,,, | Performed by: INTERNAL MEDICINE

## 2024-11-27 PROCEDURE — 93306 TTE W/DOPPLER COMPLETE: CPT | Mod: 26,,, | Performed by: INTERNAL MEDICINE

## 2024-11-27 PROCEDURE — 93306 TTE W/DOPPLER COMPLETE: CPT

## 2024-11-27 PROCEDURE — 93017 CV STRESS TEST TRACING ONLY: CPT

## 2024-11-28 LAB
AORTIC ROOT ANNULUS: 2.58 CM
ASCENDING AORTA: 3.15 CM
AV INDEX (PROSTH): 1.06
AV MEAN GRADIENT: 2.6 MMHG
AV PEAK GRADIENT: 4.8 MMHG
AV VALVE AREA BY VELOCITY RATIO: 2.6 CM²
AV VALVE AREA: 3.3 CM²
AV VELOCITY RATIO: 0.82
BSA FOR ECHO PROCEDURE: 2.18 M2
CV ECHO LV RWT: 0.42 CM
CV STRESS BASE HR: 86 BPM
DIASTOLIC BLOOD PRESSURE: 97 MMHG
DOP CALC AO PEAK VEL: 1.1 M/S
DOP CALC AO VTI: 18 CM
DOP CALC LVOT AREA: 3.1 CM2
DOP CALC LVOT DIAMETER: 2 CM
DOP CALC LVOT PEAK VEL: 0.9 M/S
DOP CALC LVOT STROKE VOLUME: 59.7 CM3
DOP CALC RVOT PEAK VEL: 0.72 M/S
DOP CALC RVOT VTI: 15.5 CM
DOP CALCLVOT PEAK VEL VTI: 19 CM
E WAVE DECELERATION TIME: 226.25 MSEC
E/A RATIO: 0.93
E/E' RATIO: 10.67 M/S
ECHO LV POSTERIOR WALL: 1 CM (ref 0.6–1.1)
FRACTIONAL SHORTENING: 35.4 % (ref 28–44)
INTERVENTRICULAR SEPTUM: 0.9 CM (ref 0.6–1.1)
IVC DIAMETER: 1.78 CM
IVRT: 102.76 MSEC
LA MAJOR: 4.56 CM
LA MINOR: 4.2 CM
LA WIDTH: 3.2 CM
LEFT ATRIUM AREA SYSTOLIC (APICAL 2 CHAMBER): 12.91 CM2
LEFT ATRIUM AREA SYSTOLIC (APICAL 4 CHAMBER): 14.51 CM2
LEFT ATRIUM SIZE: 3.13 CM
LEFT ATRIUM VOLUME INDEX MOD: 16.5 ML/M2
LEFT ATRIUM VOLUME INDEX: 18 ML/M2
LEFT ATRIUM VOLUME MOD: 34.24 ML
LEFT ATRIUM VOLUME: 37.23 CM3
LEFT INTERNAL DIMENSION IN SYSTOLE: 3.1 CM (ref 2.1–4)
LEFT VENTRICLE DIASTOLIC VOLUME INDEX: 50.62 ML/M2
LEFT VENTRICLE DIASTOLIC VOLUME: 104.79 ML
LEFT VENTRICLE END SYSTOLIC VOLUME APICAL 2 CHAMBER: 30.78 ML
LEFT VENTRICLE END SYSTOLIC VOLUME APICAL 4 CHAMBER: 37.09 ML
LEFT VENTRICLE MASS INDEX: 76.7 G/M2
LEFT VENTRICLE SYSTOLIC VOLUME INDEX: 18 ML/M2
LEFT VENTRICLE SYSTOLIC VOLUME: 37.35 ML
LEFT VENTRICULAR INTERNAL DIMENSION IN DIASTOLE: 4.8 CM (ref 3.5–6)
LEFT VENTRICULAR MASS: 158.8 G
LV LATERAL E/E' RATIO: 10 M/S
LV SEPTAL E/E' RATIO: 11.43 M/S
LVED V (TEICH): 104.79 ML
LVES V (TEICH): 37.35 ML
LVOT MG: 2.04 MMHG
LVOT MV: 0.67 CM/S
MV PEAK A VEL: 0.86 M/S
MV PEAK E VEL: 0.8 M/S
MV STENOSIS PRESSURE HALF TIME: 65.61 MS
MV VALVE AREA P 1/2 METHOD: 3.35 CM2
OHS CV CPX 1 MINUTE RECOVERY HEART RATE: 139 BPM
OHS CV CPX 85 PERCENT MAX PREDICTED HEART RATE MALE: 152
OHS CV CPX ESTIMATED METS: 7
OHS CV CPX MAX PREDICTED HEART RATE: 179
OHS CV CPX PATIENT IS FEMALE: 1
OHS CV CPX PATIENT IS MALE: 0
OHS CV CPX PEAK DIASTOLIC BLOOD PRESSURE: 78 MMHG
OHS CV CPX PEAK HEAR RATE: 160 BPM
OHS CV CPX PEAK RATE PRESSURE PRODUCT: NORMAL
OHS CV CPX PEAK SYSTOLIC BLOOD PRESSURE: 166 MMHG
OHS CV CPX PERCENT MAX PREDICTED HEART RATE ACHIEVED: 94
OHS CV CPX RATE PRESSURE PRODUCT PRESENTING: NORMAL
PISA TR MAX VEL: 2.49 M/S
PV MEAN GRADIENT: 1 MMHG
PV PEAK GRADIENT: 3 MMHG
PV PEAK VELOCITY: 0.91 M/S
RA MAJOR: 4.5 CM
RA PRESSURE ESTIMATED: 3 MMHG
RA WIDTH: 2.7 CM
RIGHT VENTRICULAR END-DIASTOLIC DIMENSION: 3.05 CM
RV TB RVSP: 5 MMHG
SINUS: 2.73 CM
STJ: 2.51 CM
STRESS ECHO POST EXERCISE DUR MIN: 6 MINUTES
STRESS ECHO POST EXERCISE DUR SEC: 0 SECONDS
SYSTOLIC BLOOD PRESSURE: 139 MMHG
TDI LATERAL: 0.08 M/S
TDI SEPTAL: 0.07 M/S
TDI: 0.08 M/S
TR MAX PG: 25 MMHG
TRICUSPID ANNULAR PLANE SYSTOLIC EXCURSION: 2.11 CM
TV REST PULMONARY ARTERY PRESSURE: 28 MMHG
Z-SCORE OF LEFT VENTRICULAR DIMENSION IN END DIASTOLE: -2.71
Z-SCORE OF LEFT VENTRICULAR DIMENSION IN END SYSTOLE: -1.72

## 2024-12-05 ENCOUNTER — TELEPHONE (OUTPATIENT)
Dept: NEUROLOGY | Facility: CLINIC | Age: 39
End: 2024-12-05
Payer: COMMERCIAL

## 2024-12-05 ENCOUNTER — PATIENT MESSAGE (OUTPATIENT)
Dept: NEUROLOGY | Facility: CLINIC | Age: 39
End: 2024-12-05
Payer: COMMERCIAL

## 2024-12-05 NOTE — TELEPHONE ENCOUNTER
Renewed PA  for nurtec  PA approved   Contacted pharmacy medication with through with $0 co-payment        Vm left for pt to contact office .

## 2024-12-05 NOTE — TELEPHONE ENCOUNTER
----- Message from [place] sent at 12/5/2024 12:49 PM CST -----  Type:  Needs Medical Advice    Who Called: ROLAND NARANJO [[id number]]  Symptoms (please be specific):    How long has patient had these symptoms:    Pharmacy name and phone #:    Would the patient rather a call back or a response via [facility]?   Best Call Back Number:  [phone]  Additional Information:  Patient called in regards to medication rimegepant (NURTEC) 75 mg. Patient states document was sent over. Patient will like a call regard status of prescription

## 2024-12-11 ENCOUNTER — PATIENT MESSAGE (OUTPATIENT)
Dept: INTERNAL MEDICINE | Facility: CLINIC | Age: 39
End: 2024-12-11
Payer: COMMERCIAL

## 2024-12-12 ENCOUNTER — CLINICAL SUPPORT (OUTPATIENT)
Dept: INTERNAL MEDICINE | Facility: CLINIC | Age: 39
End: 2024-12-12
Payer: COMMERCIAL

## 2024-12-12 VITALS — DIASTOLIC BLOOD PRESSURE: 88 MMHG | SYSTOLIC BLOOD PRESSURE: 136 MMHG

## 2024-12-12 DIAGNOSIS — I10 HYPERTENSION, UNSPECIFIED TYPE: Primary | ICD-10-CM

## 2024-12-12 PROCEDURE — 99999 PR PBB SHADOW E&M-EST. PATIENT-LVL II: CPT | Mod: PBBFAC,,,

## 2024-12-12 NOTE — PROGRESS NOTES
Patient came in for blood pressure check and after sitting for 10 minutes 136/88 will call with any orders.

## 2025-05-07 ENCOUNTER — PATIENT MESSAGE (OUTPATIENT)
Dept: CARDIOLOGY | Facility: CLINIC | Age: 40
End: 2025-05-07
Payer: COMMERCIAL

## 2025-05-07 DIAGNOSIS — I10 HYPERTENSION, UNSPECIFIED TYPE: ICD-10-CM

## 2025-05-09 RX ORDER — AMLODIPINE BESYLATE 10 MG/1
10 TABLET ORAL DAILY
Qty: 90 TABLET | Refills: 3 | Status: SHIPPED | OUTPATIENT
Start: 2025-05-09

## 2025-05-27 ENCOUNTER — RESULTS FOLLOW-UP (OUTPATIENT)
Dept: INTERNAL MEDICINE | Facility: CLINIC | Age: 40
End: 2025-05-27

## 2025-05-27 ENCOUNTER — OFFICE VISIT (OUTPATIENT)
Dept: INTERNAL MEDICINE | Facility: CLINIC | Age: 40
End: 2025-05-27
Payer: COMMERCIAL

## 2025-05-27 ENCOUNTER — LAB VISIT (OUTPATIENT)
Dept: LAB | Facility: HOSPITAL | Age: 40
End: 2025-05-27
Attending: NURSE PRACTITIONER
Payer: COMMERCIAL

## 2025-05-27 VITALS
RESPIRATION RATE: 18 BRPM | HEART RATE: 95 BPM | OXYGEN SATURATION: 97 % | SYSTOLIC BLOOD PRESSURE: 130 MMHG | TEMPERATURE: 98 F | BODY MASS INDEX: 42.15 KG/M2 | WEIGHT: 237.88 LBS | DIASTOLIC BLOOD PRESSURE: 90 MMHG | HEIGHT: 63 IN

## 2025-05-27 DIAGNOSIS — I10 HYPERTENSION, UNSPECIFIED TYPE: Primary | ICD-10-CM

## 2025-05-27 DIAGNOSIS — M25.474 BILATERAL SWELLING OF FEET AND ANKLES: ICD-10-CM

## 2025-05-27 DIAGNOSIS — T14.8XXA BRUISING: ICD-10-CM

## 2025-05-27 DIAGNOSIS — M25.472 BILATERAL SWELLING OF FEET AND ANKLES: ICD-10-CM

## 2025-05-27 DIAGNOSIS — M25.471 BILATERAL SWELLING OF FEET AND ANKLES: ICD-10-CM

## 2025-05-27 DIAGNOSIS — M25.475 BILATERAL SWELLING OF FEET AND ANKLES: ICD-10-CM

## 2025-05-27 LAB
ABSOLUTE EOSINOPHIL (OHS): 0.22 K/UL
ABSOLUTE MONOCYTE (OHS): 0.6 K/UL (ref 0.3–1)
ABSOLUTE NEUTROPHIL COUNT (OHS): 5.36 K/UL (ref 1.8–7.7)
ALBUMIN SERPL BCP-MCNC: 4.5 G/DL (ref 3.5–5.2)
ALP SERPL-CCNC: 55 UNIT/L (ref 40–150)
ALT SERPL W/O P-5'-P-CCNC: 20 UNIT/L (ref 10–44)
ANION GAP (OHS): 10 MMOL/L (ref 8–16)
APTT PPP: 27.9 SECONDS (ref 21–32)
AST SERPL-CCNC: 16 UNIT/L (ref 11–45)
BASOPHILS # BLD AUTO: 0.02 K/UL
BASOPHILS NFR BLD AUTO: 0.3 %
BILIRUB SERPL-MCNC: 0.3 MG/DL (ref 0.1–1)
BNP SERPL-MCNC: <10 PG/ML (ref 0–99)
BUN SERPL-MCNC: 12 MG/DL (ref 6–20)
CALCIUM SERPL-MCNC: 9.7 MG/DL (ref 8.7–10.5)
CHLORIDE SERPL-SCNC: 104 MMOL/L (ref 95–110)
CO2 SERPL-SCNC: 27 MMOL/L (ref 23–29)
CREAT SERPL-MCNC: 0.8 MG/DL (ref 0.5–1.4)
D DIMER PPP IA.FEU-MCNC: 0.32 MG/L FEU
ERYTHROCYTE [DISTWIDTH] IN BLOOD BY AUTOMATED COUNT: 11.8 % (ref 11.5–14.5)
GFR SERPLBLD CREATININE-BSD FMLA CKD-EPI: >60 ML/MIN/1.73/M2
GLUCOSE SERPL-MCNC: 79 MG/DL (ref 70–110)
HCT VFR BLD AUTO: 41 % (ref 37–48.5)
HGB BLD-MCNC: 13.8 GM/DL (ref 12–16)
IMM GRANULOCYTES # BLD AUTO: 0.04 K/UL (ref 0–0.04)
IMM GRANULOCYTES NFR BLD AUTO: 0.5 % (ref 0–0.5)
INR PPP: 1 (ref 0.8–1.2)
LYMPHOCYTES # BLD AUTO: 1.47 K/UL (ref 1–4.8)
MCH RBC QN AUTO: 30.8 PG (ref 27–31)
MCHC RBC AUTO-ENTMCNC: 33.7 G/DL (ref 32–36)
MCV RBC AUTO: 92 FL (ref 82–98)
NUCLEATED RBC (/100WBC) (OHS): 0 /100 WBC
PLATELET # BLD AUTO: 311 K/UL (ref 150–450)
PMV BLD AUTO: 9.9 FL (ref 9.2–12.9)
POTASSIUM SERPL-SCNC: 4.2 MMOL/L (ref 3.5–5.1)
PROT SERPL-MCNC: 8.4 GM/DL (ref 6–8.4)
PROTHROMBIN TIME: 11.1 SECONDS (ref 9–12.5)
RBC # BLD AUTO: 4.48 M/UL (ref 4–5.4)
RELATIVE EOSINOPHIL (OHS): 2.9 %
RELATIVE LYMPHOCYTE (OHS): 19.1 % (ref 18–48)
RELATIVE MONOCYTE (OHS): 7.8 % (ref 4–15)
RELATIVE NEUTROPHIL (OHS): 69.4 % (ref 38–73)
SODIUM SERPL-SCNC: 141 MMOL/L (ref 136–145)
TSH SERPL-ACNC: 2.8 UIU/ML (ref 0.4–4)
WBC # BLD AUTO: 7.71 K/UL (ref 3.9–12.7)

## 2025-05-27 PROCEDURE — 84443 ASSAY THYROID STIM HORMONE: CPT | Mod: PO

## 2025-05-27 PROCEDURE — 83880 ASSAY OF NATRIURETIC PEPTIDE: CPT | Mod: PO

## 2025-05-27 PROCEDURE — 99999 PR PBB SHADOW E&M-EST. PATIENT-LVL IV: CPT | Mod: PBBFAC,,, | Performed by: NURSE PRACTITIONER

## 2025-05-27 PROCEDURE — 85730 THROMBOPLASTIN TIME PARTIAL: CPT | Mod: PO

## 2025-05-27 PROCEDURE — 84295 ASSAY OF SERUM SODIUM: CPT | Mod: PO

## 2025-05-27 PROCEDURE — 85610 PROTHROMBIN TIME: CPT | Mod: PO

## 2025-05-27 PROCEDURE — 85025 COMPLETE CBC W/AUTO DIFF WBC: CPT | Mod: PO

## 2025-05-27 PROCEDURE — 99214 OFFICE O/P EST MOD 30 MIN: CPT | Mod: S$GLB,,, | Performed by: NURSE PRACTITIONER

## 2025-05-27 PROCEDURE — 85379 FIBRIN DEGRADATION QUANT: CPT | Mod: PO

## 2025-05-27 PROCEDURE — 36415 COLL VENOUS BLD VENIPUNCTURE: CPT | Mod: PO

## 2025-05-27 RX ORDER — OLMESARTAN MEDOXOMIL 20 MG/1
20 TABLET ORAL DAILY
Qty: 30 TABLET | Refills: 0 | Status: SHIPPED | OUTPATIENT
Start: 2025-05-27 | End: 2025-06-26

## 2025-05-27 NOTE — PROGRESS NOTES
Subjective:       Patient ID: Shruti Mercado is a 40 y.o. female.    Chief Complaint: Foot Swelling    History of Present Illness    HPI:  Ms. Mercado reports swelling, bruising, and discomfort in both feet and legs for approximately 1 month. The symptoms have been fluctuating, with Friday being particularly severe and today being somewhat better. She describes recurrent bumps, swelling, and bruising, particularly from the ankle area down, with the right ankle being the worst affected. She develops significant ankle swelling, which can be quite pronounced by the end of the workday, requiring 30-40 minutes of elevation for the swelling to subside.    She reports a sensation of heat in the affected areas, describing it as feeling similar to a sunburn with pulsations. The area from the lower legs down is tender and sensitive.    The discomfort worsens with prolonged periods of walking, necessitating breaks. She expresses concern about an upcoming vacation involving hiking activities. This is the first time she has had these symptoms.    Her blood pressure medication (amlodipine) was recently increased from 5mg to 10mg around the first week of May, which coincides with the onset of these symptoms. She also takes hydrochlorothiazide for blood pressure and fluid retention, as well as a medication for stomach issues.    She reports occasional palpitations, described as heart flutters, which have been previously evaluated by cardiology and deemed normal. She had an echocardiogram and stress test in November, both of which were reported as normal.    She denies fever, chills, or body aches apart from the legs. She denies chest pain, shortness of breath, or itching in the affected areas.           HPI    Problem List[1]    Family History   Problem Relation Name Age of Onset    Hypertension Mother      Hypertension Father      Diabetes Father      Cancer Sister      No Known Problems Daughter      No Known Problems Son      No  "Known Problems Sister      No Known Problems Sister      No Known Problems Sister      Cancer Paternal Grandfather      Stroke Paternal Grandfather       Past Surgical History:   Procedure Laterality Date    ABSCESS DRAINAGE      ENDOMETRIAL ABLATION  02/22/2019    HIP SURGERY Right     LAPAROSCOPIC TUBAL LIGATION W/ ANTOINETTE CLIPS  2008    OVARIAN CYST SURGERY      sleeve      TUBAL LIGATION         Current Medications[2]    Review of Systems   Constitutional:  Negative for chills, fatigue and fever.   Respiratory:  Negative for cough and shortness of breath.    Cardiovascular:  Positive for palpitations and leg swelling. Negative for chest pain.   Musculoskeletal:  Positive for myalgias.   Skin:  Positive for color change.   Neurological:  Negative for dizziness, syncope, speech difficulty, weakness, light-headedness and numbness.       Objective:   BP (!) 130/90   Pulse 95   Temp 98.1 °F (36.7 °C) (Tympanic)   Resp 18   Ht 5' 3" (1.6 m)   Wt 107.9 kg (237 lb 14 oz)   SpO2 97%   BMI 42.14 kg/m²      Physical Exam  Vitals reviewed.   Constitutional:       General: She is not in acute distress.     Appearance: Normal appearance. She is obese. She is not ill-appearing, toxic-appearing or diaphoretic.   HENT:      Head: Normocephalic.   Eyes:      Conjunctiva/sclera: Conjunctivae normal.   Cardiovascular:      Rate and Rhythm: Normal rate and regular rhythm.      Pulses:           Dorsalis pedis pulses are 2+ on the right side and 2+ on the left side.      Heart sounds: Normal heart sounds.   Pulmonary:      Effort: Pulmonary effort is normal. No respiratory distress.      Breath sounds: Normal breath sounds. No wheezing, rhonchi or rales.   Musculoskeletal:      Right lower leg: Edema present.      Left lower leg: Edema present.   Feet:      Right foot:      Skin integrity: No ulcer, skin breakdown, erythema or warmth.      Left foot:      Skin integrity: No ulcer, skin breakdown, erythema or warmth.      " Comments: Bilateral dependent edema to ankle/feet,   Neurological:      Mental Status: She is alert and oriented to person, place, and time.      Coordination: Coordination normal.      Gait: Gait normal.   Psychiatric:         Mood and Affect: Mood normal.         Behavior: Behavior normal.         Assessment & Plan     Assessment & Plan    ## ESSENTIAL HYPERTENSION:  - Monitored blood pressure readings, noting improvement since increasing amlodipine dosing, home readings previously with systolic in the 180s and diastolic in the 90s.  - Discussed treatment options.  - Discontinued amlodipine 10 mg due to side effects and prescribed olmesartan 20 mg daily in the morning for BP control.  - Continued hydrochlorothiazide (HCTZ).  - Discussed potential side effects of olmesartan.  - Informed about the possibility of combining olmesartan with the current diuretic in the future for simplified medication regimen.  - Instructed the patient to monitor BP at home, maintain a log of readings, and follow up in 2 weeks or sooner if needed..    ## SWELLING OF BILATERAL LOWER EXTREMITIES:  - Monitored the patient's reports of swelling, bruising, and discoloration in both feet and legs, with symptoms worsening at the end of the day.  - Assessed potential causes.  - Ordered comprehensive lab work including CBC, CMP, BNP, D-dimer, PT, and INR.  - Discussed symptoms likely side effect of amlodipine.    ## ADVERSE EFFECT OF CALCIUM-CHANNEL BLOCKERS:  - Monitored the patient's reports of leg swelling after increase in amlodipine dosage from 5 mg to 10 mg.  - Evaluated this as a side effect of increased amlodipine dosage and discontinued the medication, replacing it with olmesartan 20 mg daily.    ## PALPITATIONS:  - Monitored the patient's reports of heart flutters and palpitations.  - Noted that previous evaluation of palpitations was normal.    ## FOLLOW-UP:  - Scheduled follow up in 2 weeks for BP check and medication adjustment.      "     1. Hypertension, unspecified type  -     olmesartan (BENICAR) 20 MG tablet; Take 1 tablet (20 mg total) by mouth once daily.  Dispense: 30 tablet; Refill: 0    2. Bilateral swelling of feet and ankles  -     CBC Auto Differential; Future; Expected date: 05/27/2025  -     Comprehensive Metabolic Panel; Future; Expected date: 05/27/2025  -     TSH; Future; Expected date: 05/27/2025  -     BNP; Future; Expected date: 05/27/2025  -     D-Dimer, Quantitative; Future; Expected date: 05/27/2025    3. Bruising  -     Protime-INR; Future; Expected date: 05/27/2025  -     APTT; Future; Expected date: 05/27/2025            [facility]    This note was generated with the assistance of ambient listening technology. Verbal consent was obtained by the patient and accompanying visitor(s) for the recording of patient appointment to facilitate this note. I attest to having reviewed and edited the generated note for accuracy, though some syntax or spelling errors may persist. Please contact the author of this note for any clarification.       Portions of this note may have been created with voice recognition software. Occasional "wrong-word" or "sound-a-like" substitutions may have occurred due to the inherent limitations of voice recognition software. Please, read the note carefully and recognize, using context, where substitutions have occurred.             [1]   Patient Active Problem List  Diagnosis    GERD (gastroesophageal reflux disease)    Pseudotumor cerebri    Migraine without aura and without status migrainosus, not intractable    Obesity, morbid, BMI 40.0-49.9    Chronic fatigue    History of sleeve gastrectomy    Tension headache    Awareness alteration, transient    Hypertension   [2]   Current Outpatient Medications:     dicyclomine (BENTYL) 20 mg tablet, Take 20 mg by mouth 4 (four) times daily as needed., Disp: , Rfl:     hydroCHLOROthiazide (HYDRODIURIL) 12.5 MG Tab, Take 1 tablet (12.5 mg total) by " mouth once daily., Disp: 30 tablet, Rfl: 11    multivitamin capsule, Take 1 capsule by mouth once daily., Disp: , Rfl:     rimegepant (NURTEC) 75 mg odt, TAKE 1 TABLET BY MOUTH AS NEEDED FOR MIGRAINE. MAX 3 DOSES PER WEEK. CAN BE PLACED ON OR UNDER TONGUE, Disp: 8 tablet, Rfl: 3    olmesartan (BENICAR) 20 MG tablet, Take 1 tablet (20 mg total) by mouth once daily., Disp: 30 tablet, Rfl: 0

## 2025-06-09 ENCOUNTER — PATIENT MESSAGE (OUTPATIENT)
Dept: INTERNAL MEDICINE | Facility: CLINIC | Age: 40
End: 2025-06-09
Payer: COMMERCIAL

## 2025-06-10 VITALS — SYSTOLIC BLOOD PRESSURE: 114 MMHG | DIASTOLIC BLOOD PRESSURE: 77 MMHG

## 2025-06-20 ENCOUNTER — PATIENT MESSAGE (OUTPATIENT)
Dept: INTERNAL MEDICINE | Facility: CLINIC | Age: 40
End: 2025-06-20
Payer: COMMERCIAL

## 2025-06-25 DIAGNOSIS — I10 HYPERTENSION, UNSPECIFIED TYPE: ICD-10-CM

## 2025-06-25 RX ORDER — OLMESARTAN MEDOXOMIL 20 MG/1
20 TABLET ORAL DAILY
Qty: 90 TABLET | Refills: 1 | Status: SHIPPED | OUTPATIENT
Start: 2025-06-25 | End: 2025-12-22